# Patient Record
Sex: FEMALE | Race: WHITE | Employment: FULL TIME
[De-identification: names, ages, dates, MRNs, and addresses within clinical notes are randomized per-mention and may not be internally consistent; named-entity substitution may affect disease eponyms.]

---
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
## 2019-01-14 ENCOUNTER — TELEPHONE (OUTPATIENT)
Dept: FAMILY MEDICINE CLINIC | Age: 19
End: 2019-01-14

## 2019-03-04 ENCOUNTER — OFFICE VISIT (OUTPATIENT)
Dept: FAMILY MEDICINE CLINIC | Age: 19
End: 2019-03-04
Payer: COMMERCIAL

## 2019-03-04 VITALS
WEIGHT: 257 LBS | HEIGHT: 64 IN | BODY MASS INDEX: 43.87 KG/M2 | SYSTOLIC BLOOD PRESSURE: 115 MMHG | DIASTOLIC BLOOD PRESSURE: 82 MMHG

## 2019-03-04 DIAGNOSIS — A59.9 TRICHIMONIASIS: ICD-10-CM

## 2019-03-04 DIAGNOSIS — E46 MALNUTRITION, UNSPECIFIED TYPE (HCC): Primary | ICD-10-CM

## 2019-03-04 DIAGNOSIS — Z30.432 AWAITING REMOVAL OF CONTRACEPTIVE INTRAUTERINE DEVICE (IUD): ICD-10-CM

## 2019-03-04 DIAGNOSIS — N89.8 VAGINAL DISCHARGE: ICD-10-CM

## 2019-03-04 DIAGNOSIS — Z86.19 HISTORY OF SEXUALLY TRANSMITTED DISEASE: ICD-10-CM

## 2019-03-04 DIAGNOSIS — R11.2 INTRACTABLE VOMITING WITH NAUSEA, UNSPECIFIED VOMITING TYPE: ICD-10-CM

## 2019-03-04 DIAGNOSIS — R10.13 EPIGASTRIC PAIN: ICD-10-CM

## 2019-03-04 DIAGNOSIS — Z98.890 S/P GASTRIC SURGERY: ICD-10-CM

## 2019-03-04 DIAGNOSIS — B96.89 BV (BACTERIAL VAGINOSIS): ICD-10-CM

## 2019-03-04 DIAGNOSIS — N76.0 BV (BACTERIAL VAGINOSIS): ICD-10-CM

## 2019-03-04 DIAGNOSIS — Q21.12 PFO (PATENT FORAMEN OVALE): ICD-10-CM

## 2019-03-04 PROCEDURE — 1036F TOBACCO NON-USER: CPT | Performed by: NURSE PRACTITIONER

## 2019-03-04 PROCEDURE — G8417 CALC BMI ABV UP PARAM F/U: HCPCS | Performed by: NURSE PRACTITIONER

## 2019-03-04 PROCEDURE — G8484 FLU IMMUNIZE NO ADMIN: HCPCS | Performed by: NURSE PRACTITIONER

## 2019-03-04 PROCEDURE — 99213 OFFICE O/P EST LOW 20 MIN: CPT | Performed by: NURSE PRACTITIONER

## 2019-03-04 PROCEDURE — G8427 DOCREV CUR MEDS BY ELIG CLIN: HCPCS | Performed by: NURSE PRACTITIONER

## 2019-03-04 PROCEDURE — 96372 THER/PROPH/DIAG INJ SC/IM: CPT | Performed by: NURSE PRACTITIONER

## 2019-03-04 RX ORDER — URSODIOL 500 MG/1
500 TABLET, FILM COATED ORAL
COMMUNITY
Start: 2018-12-19 | End: 2019-10-17 | Stop reason: ALTCHOICE

## 2019-03-04 RX ORDER — METRONIDAZOLE 500 MG/1
500 TABLET ORAL 2 TIMES DAILY
Qty: 14 TABLET | Refills: 0 | Status: SHIPPED | OUTPATIENT
Start: 2019-03-04 | End: 2019-03-11

## 2019-03-04 RX ORDER — FAMOTIDINE 20 MG/1
20 TABLET, FILM COATED ORAL
COMMUNITY
Start: 2019-02-21 | End: 2019-10-17

## 2019-03-04 RX ORDER — SUCRALFATE ORAL 1 G/10ML
1000 SUSPENSION ORAL
COMMUNITY
Start: 2019-02-21 | End: 2019-10-17

## 2019-03-04 RX ORDER — CEFTRIAXONE 500 MG/1
500 INJECTION, POWDER, FOR SOLUTION INTRAMUSCULAR; INTRAVENOUS ONCE
Status: COMPLETED | OUTPATIENT
Start: 2019-03-04 | End: 2019-03-04

## 2019-03-04 RX ADMIN — CEFTRIAXONE 500 MG: 500 INJECTION, POWDER, FOR SOLUTION INTRAMUSCULAR; INTRAVENOUS at 10:25

## 2019-03-04 ASSESSMENT — ENCOUNTER SYMPTOMS
NAUSEA: 1
COUGH: 0
DIARRHEA: 0
ABDOMINAL PAIN: 1
BLOOD IN STOOL: 0
ABDOMINAL DISTENTION: 0
CONSTIPATION: 0
VOMITING: 1
ANAL BLEEDING: 0
SHORTNESS OF BREATH: 0
CHEST TIGHTNESS: 0

## 2019-03-04 ASSESSMENT — PATIENT HEALTH QUESTIONNAIRE - PHQ9
SUM OF ALL RESPONSES TO PHQ QUESTIONS 1-9: 3
SUM OF ALL RESPONSES TO PHQ9 QUESTIONS 1 & 2: 3
SUM OF ALL RESPONSES TO PHQ QUESTIONS 1-9: 3
1. LITTLE INTEREST OR PLEASURE IN DOING THINGS: 1
2. FEELING DOWN, DEPRESSED OR HOPELESS: 2

## 2019-03-05 ENCOUNTER — TELEPHONE (OUTPATIENT)
Dept: FAMILY MEDICINE CLINIC | Age: 19
End: 2019-03-05

## 2019-03-12 ENCOUNTER — HOSPITAL ENCOUNTER (OUTPATIENT)
Dept: NON INVASIVE DIAGNOSTICS | Age: 19
Discharge: HOME | End: 2019-03-12
Payer: COMMERCIAL

## 2019-03-12 DIAGNOSIS — Q21.12 PFO (PATENT FORAMEN OVALE): ICD-10-CM

## 2019-03-12 LAB
LV EF: 55 %
LVEF MODALITY: NORMAL

## 2019-03-12 PROCEDURE — 93306 TTE W/DOPPLER COMPLETE: CPT

## 2019-03-15 ENCOUNTER — HOSPITAL ENCOUNTER (OUTPATIENT)
Age: 19
Setting detail: SPECIMEN
Discharge: HOME | End: 2019-03-15
Payer: COMMERCIAL

## 2019-03-15 ENCOUNTER — TELEPHONE (OUTPATIENT)
Dept: FAMILY MEDICINE CLINIC | Age: 19
End: 2019-03-15

## 2019-03-15 ENCOUNTER — OFFICE VISIT (OUTPATIENT)
Dept: FAMILY MEDICINE CLINIC | Age: 19
End: 2019-03-15
Payer: COMMERCIAL

## 2019-03-15 VITALS
OXYGEN SATURATION: 97 % | DIASTOLIC BLOOD PRESSURE: 68 MMHG | SYSTOLIC BLOOD PRESSURE: 112 MMHG | HEIGHT: 64 IN | HEART RATE: 68 BPM | TEMPERATURE: 96.7 F | WEIGHT: 240 LBS | BODY MASS INDEX: 40.97 KG/M2

## 2019-03-15 DIAGNOSIS — E46 MALNUTRITION, UNSPECIFIED TYPE (HCC): ICD-10-CM

## 2019-03-15 DIAGNOSIS — M54.50 MIDLINE LOW BACK PAIN WITHOUT SCIATICA, UNSPECIFIED CHRONICITY: Primary | ICD-10-CM

## 2019-03-15 DIAGNOSIS — Z98.890 S/P GASTRIC SURGERY: ICD-10-CM

## 2019-03-15 DIAGNOSIS — M54.50 MIDLINE LOW BACK PAIN WITHOUT SCIATICA, UNSPECIFIED CHRONICITY: ICD-10-CM

## 2019-03-15 DIAGNOSIS — R11.2 INTRACTABLE VOMITING WITH NAUSEA, UNSPECIFIED VOMITING TYPE: ICD-10-CM

## 2019-03-15 DIAGNOSIS — N89.8 VAGINAL DISCHARGE: ICD-10-CM

## 2019-03-15 LAB
BILIRUBIN, POC: NORMAL
BLOOD URINE, POC: NEGATIVE
CLARITY, POC: CLEAR
COLOR, POC: YELLOW
DIRECT EXAM: ABNORMAL
GLUCOSE URINE, POC: NEGATIVE
KETONES, POC: 40
LEUKOCYTE EST, POC: NORMAL
Lab: ABNORMAL
NITRITE, POC: NEGATIVE
PH, POC: 7
PROTEIN, POC: NEGATIVE
SPECIFIC GRAVITY, POC: 1.02
SPECIMEN DESCRIPTION: ABNORMAL
UROBILINOGEN, POC: 0.2

## 2019-03-15 PROCEDURE — G8417 CALC BMI ABV UP PARAM F/U: HCPCS | Performed by: NURSE PRACTITIONER

## 2019-03-15 PROCEDURE — 99214 OFFICE O/P EST MOD 30 MIN: CPT | Performed by: NURSE PRACTITIONER

## 2019-03-15 PROCEDURE — 1036F TOBACCO NON-USER: CPT | Performed by: NURSE PRACTITIONER

## 2019-03-15 PROCEDURE — G8427 DOCREV CUR MEDS BY ELIG CLIN: HCPCS | Performed by: NURSE PRACTITIONER

## 2019-03-15 PROCEDURE — 81002 URINALYSIS NONAUTO W/O SCOPE: CPT | Performed by: NURSE PRACTITIONER

## 2019-03-15 PROCEDURE — G8484 FLU IMMUNIZE NO ADMIN: HCPCS | Performed by: NURSE PRACTITIONER

## 2019-03-15 RX ORDER — CIPROFLOXACIN 500 MG/1
500 TABLET, FILM COATED ORAL 2 TIMES DAILY
Qty: 10 TABLET | Refills: 0 | Status: SHIPPED | OUTPATIENT
Start: 2019-03-15 | End: 2019-03-18 | Stop reason: ALTCHOICE

## 2019-03-15 ASSESSMENT — ENCOUNTER SYMPTOMS
ABDOMINAL DISTENTION: 0
NAUSEA: 1
ABDOMINAL PAIN: 1
DIARRHEA: 0
CONSTIPATION: 0
COUGH: 0
SHORTNESS OF BREATH: 0
BACK PAIN: 1
VOMITING: 1
COLOR CHANGE: 0

## 2019-03-16 LAB
CULTURE: NORMAL
Lab: NORMAL
SPECIMEN DESCRIPTION: NORMAL

## 2019-03-17 RX ORDER — CLINDAMYCIN HYDROCHLORIDE 300 MG/1
300 CAPSULE ORAL 2 TIMES DAILY
Qty: 14 CAPSULE | Refills: 0 | Status: SHIPPED | OUTPATIENT
Start: 2019-03-17 | End: 2019-03-18 | Stop reason: CLARIF

## 2019-03-18 ENCOUNTER — TELEPHONE (OUTPATIENT)
Dept: FAMILY MEDICINE CLINIC | Age: 19
End: 2019-03-18

## 2019-03-18 LAB
C TRACH DNA GENITAL QL NAA+PROBE: NEGATIVE
N. GONORRHOEAE DNA: NEGATIVE
SPECIMEN DESCRIPTION: NORMAL

## 2019-03-18 RX ORDER — METRONIDAZOLE 7.5 MG/G
GEL VAGINAL
Qty: 70 G | Refills: 0 | Status: SHIPPED | OUTPATIENT
Start: 2019-03-18 | End: 2019-03-25

## 2019-03-19 ENCOUNTER — PROCEDURE VISIT (OUTPATIENT)
Dept: OBGYN CLINIC | Age: 19
End: 2019-03-19
Payer: COMMERCIAL

## 2019-03-19 ENCOUNTER — HOSPITAL ENCOUNTER (OUTPATIENT)
Age: 19
Setting detail: SPECIMEN
Discharge: HOME | End: 2019-03-19
Payer: COMMERCIAL

## 2019-03-19 VITALS
SYSTOLIC BLOOD PRESSURE: 124 MMHG | BODY MASS INDEX: 40.8 KG/M2 | HEIGHT: 64 IN | DIASTOLIC BLOOD PRESSURE: 78 MMHG | WEIGHT: 239 LBS

## 2019-03-19 DIAGNOSIS — Z11.3 ROUTINE SCREENING FOR STI (SEXUALLY TRANSMITTED INFECTION): Primary | ICD-10-CM

## 2019-03-19 DIAGNOSIS — N94.6 DYSMENORRHEA: ICD-10-CM

## 2019-03-19 DIAGNOSIS — Z11.3 ROUTINE SCREENING FOR STI (SEXUALLY TRANSMITTED INFECTION): ICD-10-CM

## 2019-03-19 LAB
DIRECT EXAM: ABNORMAL
Lab: ABNORMAL
SPECIMEN DESCRIPTION: ABNORMAL

## 2019-03-19 PROCEDURE — 58300 INSERT INTRAUTERINE DEVICE: CPT | Performed by: OBSTETRICS & GYNECOLOGY

## 2019-03-19 PROCEDURE — 58301 REMOVE INTRAUTERINE DEVICE: CPT | Performed by: OBSTETRICS & GYNECOLOGY

## 2019-04-16 ENCOUNTER — TELEPHONE (OUTPATIENT)
Dept: OBGYN CLINIC | Age: 19
End: 2019-04-16

## 2019-04-16 ENCOUNTER — HOSPITAL ENCOUNTER (OUTPATIENT)
Age: 19
Setting detail: SPECIMEN
Discharge: HOME | End: 2019-04-16
Payer: COMMERCIAL

## 2019-04-16 ENCOUNTER — OFFICE VISIT (OUTPATIENT)
Dept: OBGYN CLINIC | Age: 19
End: 2019-04-16

## 2019-04-16 VITALS
BODY MASS INDEX: 38.41 KG/M2 | WEIGHT: 225 LBS | SYSTOLIC BLOOD PRESSURE: 122 MMHG | DIASTOLIC BLOOD PRESSURE: 82 MMHG | HEIGHT: 64 IN

## 2019-04-16 DIAGNOSIS — Z30.431 IUD CHECK UP: Primary | ICD-10-CM

## 2019-04-16 DIAGNOSIS — N89.8 VAGINAL DISCHARGE: ICD-10-CM

## 2019-04-16 LAB
DIRECT EXAM: NORMAL
Lab: NORMAL
SPECIMEN DESCRIPTION: NORMAL

## 2019-04-16 PROCEDURE — 1036F TOBACCO NON-USER: CPT | Performed by: OBSTETRICS & GYNECOLOGY

## 2019-04-16 PROCEDURE — 99024 POSTOP FOLLOW-UP VISIT: CPT | Performed by: OBSTETRICS & GYNECOLOGY

## 2019-04-16 PROCEDURE — G8427 DOCREV CUR MEDS BY ELIG CLIN: HCPCS | Performed by: OBSTETRICS & GYNECOLOGY

## 2019-04-16 PROCEDURE — G8417 CALC BMI ABV UP PARAM F/U: HCPCS | Performed by: OBSTETRICS & GYNECOLOGY

## 2019-04-16 RX ORDER — TRANEXAMIC ACID 650 1/1
650 TABLET ORAL 3 TIMES DAILY
Qty: 15 TABLET | Refills: 0 | Status: SHIPPED | OUTPATIENT
Start: 2019-04-16 | End: 2019-10-17

## 2019-04-16 ASSESSMENT — ENCOUNTER SYMPTOMS
WHEEZING: 0
ABDOMINAL PAIN: 0
VOMITING: 0
DIARRHEA: 0
CONSTIPATION: 0
NAUSEA: 0
COUGH: 0

## 2019-04-16 NOTE — TELEPHONE ENCOUNTER
received a fax from [employer] that ramonita FITZPATRICK , called [facility] at [phone] and spoke with Amanda Asher. Gave clinical info and she got approval with # [id number] .  Then called [facility] and spoke with Jodi Dumont and rx went thru

## 2019-04-16 NOTE — PROGRESS NOTES
[facility], [address] [place] [address],[address]  [place], [address] [place] [address] Avenue OF VISIT:  19    [address] [place] [address]    :  2000  CHIEF COMPLAINT:    Chief Complaint   Patient presents with    1 Month Follow-Up     Clarita Pel IUD Insert 3/19/19 - Has been bleeding since a few days after insertion, bleeding has been heavy/clotty with constant cramping. HPI :   [address] [place] [address] is a 22 y.o. female here for follow up after Clarita Pel insertion 3/19/19. She reports daily bleeding since placement, with occasional cramping which will last about an hour. She's had several episodes of bacterial vaginosis in the past and is unsure if she's having symptoms now. She is unable to take Motrin since her gastric sleeve procedure. Hasn't tried Tylenol. Some discomfort with intercourse.      Past Medical History:   Diagnosis Date    Anxiety     Bipolar 1 disorder ([address]     BV (bacterial vaginosis) 2018    Chlamydia     Depression     Diarrhea     FREQUENT    Gonorrhea     Migraine     Neurocardiogenic syncope     DR MARTIN    Obesity 2012    Panic attacks     PFO (patent foramen ovale) 2012    PFO (patent foramen ovale) 2012    PTSD (post-traumatic stress disorder)     Sensory integration disorder     Trichimoniasis 2018      Past Surgical History:   Procedure Laterality Date    APPENDECTOMY      COLONOSCOPY  2015    ENDOSCOPY, COLON, DIAGNOSTIC      INTRAUTERINE DEVICE INSERTION  03/15/16    Radha placement    SLEEVE GASTRECTOMY  2018    TONSILLECTOMY AND ADENOIDECTOMY      WISDOM TOOTH EXTRACTION Bilateral 11/113/15    x4     Family History   Problem Relation Age of Onset    Diabetes Mother     Other Mother         memory problems    Cancer Maternal Aunt         bone    Brain Cancer Maternal Aunt     Cancer Maternal Grandmother         lung    No Known Problems Father     No Known Problems Maternal Grandfather     No Known Problems Paternal Grandmother     No Known Problems Paternal Grandfather     Other Other         Gallstones, Pancreatitis     Social History     Tobacco Use   Smoking Status Passive Smoke Exposure - Never Smoker   Smokeless Tobacco Never Used     Social History     Substance and Sexual Activity   Alcohol Use No    Alcohol/week: 0.0 oz     Current Outpatient Medications   Medication Sig Dispense Refill    tranexamic acid (LYSTEDA) 650 MG TABS tablet Take 1 tablet by mouth 3 times daily for 5 days 15 tablet 0    CARBONYL IRON PO Take 1 tablet by mouth      famotidine (PEPCID) 20 MG tablet Take 20 mg by mouth      sucralfate (CARAFATE) 1 GM/10ML suspension Take 1,000 mg by mouth      ursodiol (ACTIGALL) 500 MG tablet Take 500 mg by mouth      albuterol sulfate HFA (VENTOLIN HFA) 108 (90 Base) MCG/ACT inhaler Inhale 2 puffs into the lungs every 4 hours as needed for Wheezing or Shortness of Breath 1 Inhaler 0    risperiDONE (RISPERDAL) 1 MG tablet take 1 tablet by mouth daily at bedtime  0    lamoTRIgine (LAMICTAL) 25 MG tablet Take 25 mg by mouth daily       Current Facility-Administered Medications   Medication Dose Route Frequency Provider Last Rate Last Dose    Levonorgestrel (KYLEENA) IUD 19.5 mg 1 each  19.5 mg Intrauterine Continuous Carlos Bolden MD   1 each at 19 1412       Allergies:  Patient has no known allergies. Gynecologic History:  No LMP recorded. Sexually Active: Yes  STD History: Yes GC/CT      OB History    Para Term  AB Living   1 0 0 0 1 0   SAB TAB Ectopic Molar Multiple Live Births   0 0 0 0 0 0       Review of Systems   Constitutional: Negative for chills and fever. HENT: Negative for hearing loss. Respiratory: Negative for cough and wheezing. Cardiovascular: Negative for chest pain and palpitations. Gastrointestinal: Negative for abdominal pain, constipation, diarrhea, nausea and vomiting. Genitourinary: Negative for dysuria, frequency and urgency. Musculoskeletal: Negative for myalgias. Skin: Negative for rash. Neurological: Negative for dizziness, weakness and headaches. Hematological: Does not bruise/bleed easily. Psychiatric/Behavioral: Negative for suicidal ideas. /82 (Position: Sitting, Cuff Size: Large Adult)   Ht 5' 4\" (1.626 m)   Wt 225 lb (102.1 kg)   BMI 38.62 kg/m²     Physical Exam   Constitutional: She is oriented to person, place, and time. She appears well-developed and well-nourished. HENT:   Head: Normocephalic. Eyes: EOM are normal.   Neck: Normal range of motion. No thyromegaly present. Cardiovascular: Normal rate and regular rhythm. Pulmonary/Chest: Effort normal. No respiratory distress. Abdominal: Soft. She exhibits no distension. There is no tenderness. Genitourinary: There is no rash, tenderness, lesion or injury on the right labia. There is no rash, tenderness, lesion or injury on the left labia. Uterus is not deviated, not enlarged, not fixed and not tender. Cervix exhibits no motion tenderness, no discharge and no friability. Right adnexum displays no mass, no tenderness and no fullness. Left adnexum displays no mass, no tenderness and no fullness. No erythema or tenderness in the vagina. No foreign body in the vagina. No signs of injury around the vagina. Vaginal discharge found. Genitourinary Comments: Kyleena strings visualized per os. Mild anterior vaginal wall tenderness on exam, no levator ani tenderness or uterosacral ligament tenderness. Musculoskeletal: Normal range of motion. Neurological: She is alert and oriented to person, place, and time. Skin: Skin is warm and dry. Psychiatric: She has a normal mood and affect. Her behavior is normal. Judgment and thought content normal.       ASSESSMENT:  Kevin Veras is a 22 y.o. female      ICD-10-CM    1. IUD check up Z30.431    2. Vaginal discharge N89.8        PLAN:    Reviewed that bleeding will vary for the first 3-6 months of a new IUD.  Encouraged patient to give some more time for symptoms to resolve. Will recheck Affirm as she's had several episodes of BV in the past year. Declines GC/CT. If Affirm negative, will check USN to assess for other causes of pain.     Electronically signed by Roosevelt Hernandez MD on 4/16/2019 at 2:03 PM

## 2019-05-14 ENCOUNTER — PROCEDURE VISIT (OUTPATIENT)
Dept: OBGYN CLINIC | Age: 19
End: 2019-05-14
Payer: COMMERCIAL

## 2019-05-14 VITALS
HEIGHT: 64 IN | SYSTOLIC BLOOD PRESSURE: 136 MMHG | DIASTOLIC BLOOD PRESSURE: 82 MMHG | WEIGHT: 217 LBS | BODY MASS INDEX: 37.05 KG/M2

## 2019-05-14 DIAGNOSIS — Z30.016 ENCOUNTER FOR INITIAL PRESCRIPTION OF TRANSDERMAL PATCH HORMONAL CONTRACEPTIVE DEVICE: ICD-10-CM

## 2019-05-14 DIAGNOSIS — N93.9 ABNORMAL UTERINE BLEEDING (AUB): ICD-10-CM

## 2019-05-14 DIAGNOSIS — T83.9XXD COMPLICATION OF INTRAUTERINE DEVICE (IUD), UNSPECIFIED COMPLICATION, SUBSEQUENT ENCOUNTER: Primary | ICD-10-CM

## 2019-05-14 PROCEDURE — 58301 REMOVE INTRAUTERINE DEVICE: CPT | Performed by: OBSTETRICS & GYNECOLOGY

## 2019-05-14 ASSESSMENT — ENCOUNTER SYMPTOMS
ABDOMINAL PAIN: 0
CONSTIPATION: 0
WHEEZING: 0
NAUSEA: 0
VOMITING: 0
COUGH: 0
DIARRHEA: 0

## 2019-05-14 NOTE — PROGRESS NOTES
[facility], [address] [place] [address], [address] [place] [address] Avenue OF VISIT:  19    [address] [place] [address]    :  2000  CHIEF COMPLAINT:    Chief Complaint   Patient presents with    Procedure     IUD removal - Edythe Martinet was inserted on 3/19/19 - pain worsening, bleeding hasn't stopped        HPI :   [address] [place] [address] is a 22 y.o. female here for removal of her Edythe Martinet IUD. It was inserted without complication on 32 and she's had continuous bleeding and lower abdominal cramping since it was placed. On 19 she followed up in the office and it was confirmed to be in the correct location. She is unable to take Motrin due to history of gastric bypass, but Tylenol has not helped. She's tried Tranexamic acid as well. She had an ED visit 18 for abdominal pain and was diagnosed with internal hemorrhoids at that time, she is following up with general surgery in  for these. She wants to [place] her body a break\" and not initiate contraception immediately.     Past Medical History:   Diagnosis Date    Anxiety     Bipolar 1 disorder ([address]     BV (bacterial vaginosis) 2018    Chlamydia     Depression     Diarrhea     FREQUENT    Gonorrhea     Migraine     Neurocardiogenic syncope     DR MARTIN    Obesity 2012    Panic attacks     PFO (patent foramen ovale) 2012    PFO (patent foramen ovale) 2012    PTSD (post-traumatic stress disorder)     Sensory integration disorder     Trichimoniasis 2018      Past Surgical History:   Procedure Laterality Date    APPENDECTOMY      COLONOSCOPY  2015    ENDOSCOPY, COLON, DIAGNOSTIC      INTRAUTERINE DEVICE INSERTION  03/15/16    Radha placement    SLEEVE GASTRECTOMY  2018    TONSILLECTOMY AND ADENOIDECTOMY      WISDOM TOOTH EXTRACTION Bilateral 11/113/15    x4     Family History   Problem Relation Age of Onset    Diabetes Mother     Other Mother         memory problems    Cancer Maternal Aunt         bone    Brain Cancer Maternal Aunt     Cancer Maternal Grandmother         lung    No Known Problems Father     No Known Problems Maternal Grandfather     No Known Problems Paternal Grandmother     No Known Problems Paternal Grandfather     Other Other         Gallstones, Pancreatitis     Social History     Tobacco Use   Smoking Status Passive Smoke Exposure - Never Smoker   Smokeless Tobacco Never Used     Social History     Substance and Sexual Activity   Alcohol Use No    Alcohol/week: 0.0 oz     Current Outpatient Medications   Medication Sig Dispense Refill    norelgestromin-ethinyl estradiol (ORTHO EVRA) 150-35 MCG/24HR Place 1 patch onto the skin every 7 days 3 patch 3    tranexamic acid (LYSTEDA) 650 MG TABS tablet Take 1 tablet by mouth 3 times daily for 5 days 15 tablet 0    CARBONYL IRON PO Take 1 tablet by mouth      famotidine (PEPCID) 20 MG tablet Take 20 mg by mouth      sucralfate (CARAFATE) 1 GM/10ML suspension Take 1,000 mg by mouth      ursodiol (ACTIGALL) 500 MG tablet Take 500 mg by mouth      albuterol sulfate HFA (VENTOLIN HFA) 108 (90 Base) MCG/ACT inhaler Inhale 2 puffs into the lungs every 4 hours as needed for Wheezing or Shortness of Breath 1 Inhaler 0    risperiDONE (RISPERDAL) 1 MG tablet take 1 tablet by mouth daily at bedtime  0    lamoTRIgine (LAMICTAL) 25 MG tablet Take 25 mg by mouth daily       Current Facility-Administered Medications   Medication Dose Route Frequency Provider Last Rate Last Dose    Levonorgestrel (KYLEENA) IUD 19.5 mg 1 each  19.5 mg Intrauterine Continuous Renny Mcbride MD   1 each at 19 1412       Allergies:  Patient has no known allergies. Gynecologic History:  No LMP recorded. Sexually Active: Yes  STD History: Yes h/o PID with chlamydia      OB History    Para Term  AB Living   1 0 0 0 1 0   SAB TAB Ectopic Molar Multiple Live Births   0 0 0 0 0 0       Review of Systems   Constitutional: Negative for chills and fever.    HENT: Negative for hearing loss. Respiratory: Negative for cough and wheezing. Cardiovascular: Negative for chest pain and palpitations. Gastrointestinal: Negative for abdominal pain, constipation, diarrhea, nausea and vomiting. Genitourinary: Positive for pelvic pain and vaginal bleeding. Negative for dysuria, frequency and urgency. Musculoskeletal: Negative for myalgias. Skin: Negative for rash. Neurological: Negative for dizziness, weakness and headaches. Hematological: Does not bruise/bleed easily. Psychiatric/Behavioral: Negative for suicidal ideas. /82 (Position: Sitting, Cuff Size: Medium Adult)   Ht 5' 4\" (1.626 m)   Wt 217 lb (98.4 kg)   BMI 37.25 kg/m²     Physical Exam   Constitutional: She is oriented to person, place, and time. She appears well-developed and well-nourished. HENT:   Head: Normocephalic. Eyes: EOM are normal.   Neck: Normal range of motion. No thyromegaly present. Cardiovascular: Normal rate and regular rhythm. Pulmonary/Chest: Effort normal. No respiratory distress. Abdominal: Soft. She exhibits no distension. There is no tenderness. Genitourinary: There is no rash, tenderness, lesion or injury on the right labia. There is no rash, tenderness, lesion or injury on the left labia. Cervix exhibits no motion tenderness, no discharge and no friability. There is bleeding in the vagina. No erythema or tenderness in the vagina. No foreign body in the vagina. No signs of injury around the vagina. No vaginal discharge found. Genitourinary Comments: IUD strings visualized per os. Grasped with ring forceps and removed intact. Pt tolerated well. She was shown the IUD for confirmation of removal.    Musculoskeletal: Normal range of motion. Neurological: She is alert and oriented to person, place, and time. Skin: Skin is warm and dry. Psychiatric: She has a normal mood and affect.  Her behavior is normal. Judgment and thought content normal. ASSESSMENT:  Addi Garrido is a 22 y.o. female      ICD-10-CM    1. Complication of intrauterine device (IUD), unspecified complication, subsequent encounter T83. 9XXD 51999 - [place] REMOVE INTRAUTERINE DEVICE   2. Abnormal uterine bleeding (AUB) N93.9 89961 - [place] REMOVE INTRAUTERINE DEVICE   3. Encounter for initial prescription of transdermal patch hormonal contraceptive device Z30.016        PLAN:    IUD removed per patient request. Advised patient consider starting contraception immediately so she is protected against pregnancy. She is amenable to a prescription for the patch, declined another IUD or Nexplanon. Recommended condoms for STD prevention.     Electronically signed by Henry Esparza MD on 5/14/2019 at 3:34 PM

## 2019-05-16 ENCOUNTER — OFFICE VISIT (OUTPATIENT)
Dept: FAMILY MEDICINE CLINIC | Age: 19
End: 2019-05-16
Payer: COMMERCIAL

## 2019-05-16 VITALS
SYSTOLIC BLOOD PRESSURE: 116 MMHG | OXYGEN SATURATION: 97 % | TEMPERATURE: 97 F | DIASTOLIC BLOOD PRESSURE: 72 MMHG | WEIGHT: 218 LBS | HEART RATE: 68 BPM | BODY MASS INDEX: 37.22 KG/M2 | HEIGHT: 64 IN

## 2019-05-16 DIAGNOSIS — R09.81 SINUS CONGESTION: ICD-10-CM

## 2019-05-16 DIAGNOSIS — H69.83 EUSTACHIAN TUBE DYSFUNCTION, BILATERAL: Primary | ICD-10-CM

## 2019-05-16 DIAGNOSIS — F41.9 ANXIETY: ICD-10-CM

## 2019-05-16 DIAGNOSIS — F31.9 BIPOLAR 1 DISORDER (HCC): ICD-10-CM

## 2019-05-16 PROCEDURE — G8427 DOCREV CUR MEDS BY ELIG CLIN: HCPCS | Performed by: NURSE PRACTITIONER

## 2019-05-16 PROCEDURE — G8417 CALC BMI ABV UP PARAM F/U: HCPCS | Performed by: NURSE PRACTITIONER

## 2019-05-16 PROCEDURE — 96372 THER/PROPH/DIAG INJ SC/IM: CPT | Performed by: NURSE PRACTITIONER

## 2019-05-16 PROCEDURE — 1036F TOBACCO NON-USER: CPT | Performed by: NURSE PRACTITIONER

## 2019-05-16 PROCEDURE — 99214 OFFICE O/P EST MOD 30 MIN: CPT | Performed by: NURSE PRACTITIONER

## 2019-05-16 RX ORDER — METHYLPREDNISOLONE 4 MG/1
TABLET ORAL
Qty: 1 KIT | Refills: 0 | Status: SHIPPED | OUTPATIENT
Start: 2019-05-16 | End: 2019-05-22

## 2019-05-16 RX ORDER — LORATADINE AND PSEUDOEPHEDRINE 10; 240 MG/1; MG/1
1 TABLET, EXTENDED RELEASE ORAL DAILY
Qty: 30 TABLET | Refills: 3 | Status: SHIPPED | OUTPATIENT
Start: 2019-05-16 | End: 2019-10-17

## 2019-05-16 RX ORDER — TRIAMCINOLONE ACETONIDE 40 MG/ML
40 INJECTION, SUSPENSION INTRA-ARTICULAR; INTRAMUSCULAR ONCE
Status: COMPLETED | OUTPATIENT
Start: 2019-05-16 | End: 2019-05-16

## 2019-05-16 RX ORDER — FLUTICASONE PROPIONATE 50 MCG
2 SPRAY, SUSPENSION (ML) NASAL DAILY
Qty: 1 BOTTLE | Refills: 3 | Status: ON HOLD | OUTPATIENT
Start: 2019-05-16 | End: 2022-10-21 | Stop reason: HOSPADM

## 2019-05-16 RX ORDER — DIAZEPAM 5 MG/1
5 TABLET ORAL EVERY 12 HOURS PRN
Qty: 15 TABLET | Refills: 0 | Status: SHIPPED | OUTPATIENT
Start: 2019-05-16 | End: 2019-05-26

## 2019-05-16 RX ADMIN — TRIAMCINOLONE ACETONIDE 40 MG: 40 INJECTION, SUSPENSION INTRA-ARTICULAR; INTRAMUSCULAR at 14:05

## 2019-05-16 ASSESSMENT — ENCOUNTER SYMPTOMS
CHEST TIGHTNESS: 0
COUGH: 0
SHORTNESS OF BREATH: 0
SORE THROAT: 0
SINUS PRESSURE: 1
RHINORRHEA: 1
DIARRHEA: 0
ABDOMINAL PAIN: 0
TROUBLE SWALLOWING: 0

## 2019-05-16 NOTE — PROGRESS NOTES
Visit Information    Have you changed or started any medications since your last visit including any over-the-counter medicines, vitamins, or herbal medicines? no   Have you stopped taking any of your medications? Is so, why? -  no  Are you having any side effects from any of your medications? - no    Have you seen any other physician or provider since your last visit?  no   Have you had any other diagnostic tests since your last visit?  no   Have you been seen in the emergency room and/or had an admission in a hospital since we last saw you?  no   Have you had your routine dental cleaning in the past 6 months?  no     Do you have an active MyChart account? If no, what is the barrier? No: na    Patient Care Team:  ERICK Alonso CNP as PCP - ERICK Krishnamurthy CNP as PCP - S Attributed Provider    Medical History Review  Past Medical, Family, and Social History reviewed and  contribute to the patient presenting condition    Health Maintenance   Topic Date Due    Flu vaccine (Season Ended) 09/01/2019    Chlamydia screen  03/19/2020    DTaP/Tdap/Td vaccine (3 - Td) 11/12/2020    HIV screen  Completed    HPV vaccine  Addressed    Varicella Vaccine  Addressed    Meningococcal (ACWY) Vaccine  Addressed    Pneumococcal 0-64 years Vaccine  Aged Out     After obtaining consent, and per orders of Alanna Whitney CNP, injection of Kenalog given in Left upper quad. gluteus by Jewelene Bud. Patient instructed to remain in clinic for 20 minutes afterwards, and to report any adverse reaction to me immediately.

## 2019-05-16 NOTE — PROGRESS NOTES
[address]  [place], [address]  [phone]      Shania Fulton is a 22 y.o. female who presents today for her  medicalconditions/complaints as noted below. Shania Fulton is c/o of Ear Fullness (bilateral,left worse than right)  . HPI:    Ear Fullness    There is pain in both ears. This is a recurrent problem. The current episode started more than 1 month ago. The problem occurs constantly. The problem has been waxing and waning. There has been no fever. The pain is mild. Associated symptoms include rhinorrhea. Pertinent negatives include no abdominal pain, coughing, diarrhea, ear discharge, headaches, hearing loss, neck pain or sore throat. Associated symptoms comments: Sinus congestion. Treatments tried: has been using generic decongestant spray for past few months. The treatment provided no relief. There is no history of a chronic ear infection. Bipolar: uncontrolled, she is off all psych meds and trying to get into new dr. Zapien Los very depressed and then manic at times. She is very cyclical with her patterns.  Would like valium if able for panic attacks for now    Past Medical History:   Diagnosis Date    Anxiety     Bipolar 1 disorder ([address]     BV (bacterial vaginosis) 11/28/2018    Chlamydia     Depression     Diarrhea     FREQUENT    Gonorrhea     Migraine     Neurocardiogenic syncope     DR MARTIN    Obesity 1/30/2012    Panic attacks     PFO (patent foramen ovale) 1/30/2012    PFO (patent foramen ovale) 1/30/2012    PTSD (post-traumatic stress disorder)     Sensory integration disorder     Trichimoniasis 11/28/2018      Past Surgical History:   Procedure Laterality Date    APPENDECTOMY      COLONOSCOPY  8/13/2015    ENDOSCOPY, COLON, DIAGNOSTIC      INTRAUTERINE DEVICE INSERTION  03/15/16    Radha placement    SLEEVE GASTRECTOMY  2018    TONSILLECTOMY AND ADENOIDECTOMY      WISDOM TOOTH EXTRACTION Bilateral 11/113/15    x4 Family History   Problem Relation Age of Onset    Diabetes Mother     Other Mother         memory problems    Cancer Maternal Aunt         bone    Brain Cancer Maternal Aunt     Cancer Maternal Grandmother         lung    No Known Problems Father     No Known Problems Maternal Grandfather     No Known Problems Paternal Grandmother     No Known Problems Paternal Grandfather     Other Other         Gallstones, Pancreatitis     Social History     Tobacco Use    Smoking status: Passive Smoke Exposure - Never Smoker    Smokeless tobacco: Never Used   Substance Use Topics    Alcohol use: No     Alcohol/week: 0.0 oz      Current Outpatient Medications   Medication Sig Dispense Refill    methylPREDNISolone (MEDROL DOSEPACK) 4 MG tablet Take by mouth. 1 kit 0    fluticasone (FLONASE) 50 MCG/ACT nasal spray 2 sprays by Nasal route daily 1 Bottle 3    loratadine-pseudoephedrine (CLARITIN-D 24 HOUR)  MG per extended release tablet Take 1 tablet by mouth daily 30 tablet 3    diazepam (VALIUM) 5 MG tablet Take 1 tablet by mouth every 12 hours as needed for Anxiety or Sleep for up to 10 days.  15 tablet 0    cariprazine hcl (VRAYLAR) 1.5 MG capsule Take 1 capsule by mouth daily 30 capsule 3    norelgestromin-ethinyl estradiol (ORTHO EVRA) 150-35 MCG/24HR Place 1 patch onto the skin every 7 days 3 patch 3    CARBONYL IRON PO Take 1 tablet by mouth      sucralfate (CARAFATE) 1 GM/10ML suspension Take 1,000 mg by mouth      ursodiol (ACTIGALL) 500 MG tablet Take 500 mg by mouth      albuterol sulfate HFA (VENTOLIN HFA) 108 (90 Base) MCG/ACT inhaler Inhale 2 puffs into the lungs every 4 hours as needed for Wheezing or Shortness of Breath 1 Inhaler 0    risperiDONE (RISPERDAL) 1 MG tablet take 1 tablet by mouth daily at bedtime  0    lamoTRIgine (LAMICTAL) 25 MG tablet Take 25 mg by mouth daily      tranexamic acid (LYSTEDA) 650 MG TABS tablet Take 1 tablet by mouth 3 times daily for 5 days 15 tablet 0    famotidine (PEPCID) 20 MG tablet Take 20 mg by mouth       Current Facility-Administered Medications   Medication Dose Route Frequency Provider Last Rate Last Dose    Levonorgestrel [facility]) IUD 19.5 mg 1 each  19.5 mg Intrauterine Continuous Merline Age, MD   1 each at 03/19/19 1412     No Known Allergies    Health Maintenance   Topic Date Due    Flu vaccine (Season Ended) 09/01/2019    Chlamydia screen  03/19/2020    DTaP/Tdap/Td vaccine (3 - Td) 11/12/2020    HIV screen  Completed    HPV vaccine  Addressed    Varicella Vaccine  Addressed    Meningococcal (ACWY) Vaccine  Addressed    Pneumococcal 0-64 years Vaccine  Aged Out       Subjective:      Review of Systems   Constitutional: Negative for activity change, appetite change, chills, diaphoresis, fatigue, fever and unexpected weight change. HENT: Positive for congestion, ear pain, postnasal drip, rhinorrhea and sinus pressure. Negative for ear discharge, hearing loss, sneezing, sore throat and trouble swallowing. Respiratory: Negative for cough, chest tightness and shortness of breath. Cardiovascular: Negative for chest pain and palpitations. Gastrointestinal: Negative for abdominal pain and diarrhea. Musculoskeletal: Negative for neck pain. Neurological: Negative for dizziness and headaches. Psychiatric/Behavioral: Positive for dysphoric mood. Negative for sleep disturbance. The patient is nervous/anxious. Bipolar         Objective:      Physical Exam   Constitutional: She is oriented to person, place, and time. She appears well-developed and well-nourished. No distress. /72 (Site: Left Upper Arm, Position: Sitting, Cuff Size: Medium Adult)   Pulse 68   Temp 97 °F (36.1 °C) (Tympanic)   Ht 5' 4\" (1.626 m)   Wt 218 lb (98.9 kg)   SpO2 97%   BMI 37.42 kg/m²      HENT:   Head: Normocephalic and atraumatic. Right Ear: Hearing, external ear and ear canal normal. Tympanic membrane is bulging.  Tympanic membrane is not erythematous. No middle ear effusion. Left Ear: Hearing, external ear and ear canal normal. Tympanic membrane is bulging. Tympanic membrane is not erythematous. No middle ear effusion. Nose: Mucosal edema present. No sinus tenderness. Right sinus exhibits no maxillary sinus tenderness and no frontal sinus tenderness. Left sinus exhibits no maxillary sinus tenderness and no frontal sinus tenderness. Mouth/Throat: Uvula is midline, oropharynx is clear and moist and mucous membranes are normal. No oropharyngeal exudate. Neck: Normal range of motion. Neck supple. Cardiovascular: Normal rate, regular rhythm and normal heart sounds. Pulmonary/Chest: Effort normal and breath sounds normal. No respiratory distress. She has no wheezes. Lymphadenopathy:     She has no cervical adenopathy. Neurological: She is alert and oriented to person, place, and time. Skin: Skin is warm and dry. Capillary refill takes less than 2 seconds. No rash noted. She is not diaphoretic. Psychiatric: She has a normal mood and affect. Her behavior is normal. Judgment and thought content normal.   Nursing note and vitals reviewed. Assessment:       Diagnosis Orders   1. Eustachian tube dysfunction, bilateral  triamcinolone acetonide (KENALOG-40) injection 40 mg    methylPREDNISolone (MEDROL DOSEPACK) 4 MG tablet    fluticasone (FLONASE) 50 MCG/ACT nasal spray    loratadine-pseudoephedrine (CLARITIN-D 24 HOUR)  MG per extended release tablet   2. Sinus congestion  triamcinolone acetonide (KENALOG-40) injection 40 mg    methylPREDNISolone (MEDROL DOSEPACK) 4 MG tablet    fluticasone (FLONASE) 50 MCG/ACT nasal spray    loratadine-pseudoephedrine (CLARITIN-D 24 HOUR)  MG per extended release tablet   3. Anxiety  diazepam (VALIUM) 5 MG tablet   4. Bipolar 1 disorder (HCC)  cariprazine hcl (VRAYLAR) 1.5 MG capsule       Plan:      No follow-ups on file.   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 Orders Placed This Encounter   Medications    triamcinolone acetonide (KENALOG-40) injection 40 mg    methylPREDNISolone (MEDROL DOSEPACK) 4 MG tablet     Sig: Take by mouth. Dispense:  1 kit     Refill:  0    fluticasone (FLONASE) 50 MCG/ACT nasal spray     Si sprays by Nasal route daily     Dispense:  1 Bottle     Refill:  3    loratadine-pseudoephedrine (CLARITIN-D 24 HOUR)  MG per extended release tablet     Sig: Take 1 tablet by mouth daily     Dispense:  30 tablet     Refill:  3    diazepam (VALIUM) 5 MG tablet     Sig: Take 1 tablet by mouth every 12 hours as needed for Anxiety or Sleep for up to 10 days. Dispense:  15 tablet     Refill:  0    cariprazine hcl (VRAYLAR) 1.5 MG capsule     Sig: Take 1 capsule by mouth daily     Dispense:  30 capsule     Refill:  3       Patient given educational materials - see patient instructions. Discussed use,benefit, and side effects of prescribed medications. All patient questions answered. Pt voiced understanding. Reviewed health maintenance. Instructed to continue currentmedications, diet and exercise.     Electronically signed by Jacki Ashraf CNP on 2019 at 2:16 PM

## 2019-06-11 ENCOUNTER — TELEPHONE (OUTPATIENT)
Dept: OBGYN CLINIC | Age: 19
End: 2019-06-11

## 2019-06-11 ENCOUNTER — APPOINTMENT (OUTPATIENT)
Dept: GENERAL RADIOLOGY | Age: 19
End: 2019-06-11
Payer: COMMERCIAL

## 2019-06-11 ENCOUNTER — HOSPITAL ENCOUNTER (EMERGENCY)
Age: 19
Discharge: HOME | End: 2019-06-11
Attending: EMERGENCY MEDICINE
Payer: COMMERCIAL

## 2019-06-11 VITALS
DIASTOLIC BLOOD PRESSURE: 93 MMHG | OXYGEN SATURATION: 96 % | HEIGHT: 64 IN | RESPIRATION RATE: 15 BRPM | SYSTOLIC BLOOD PRESSURE: 144 MMHG | TEMPERATURE: 97.9 F | HEART RATE: 89 BPM | BODY MASS INDEX: 35 KG/M2 | WEIGHT: 205 LBS

## 2019-06-11 DIAGNOSIS — N93.9 VAGINAL BLEEDING: Primary | ICD-10-CM

## 2019-06-11 LAB
-: NORMAL
ABSOLUTE EOS #: 0.04 K/UL (ref 0–0.44)
ABSOLUTE IMMATURE GRANULOCYTE: <0.03 K/UL (ref 0–0.3)
ABSOLUTE LYMPH #: 2.05 K/UL (ref 1.2–5.2)
ABSOLUTE MONO #: 0.49 K/UL (ref 0.1–1.4)
AMORPHOUS: NORMAL
ANION GAP SERPL CALCULATED.3IONS-SCNC: 12 MMOL/L (ref 9–17)
BACTERIA: NORMAL
BASOPHILS # BLD: 0 % (ref 0–2)
BASOPHILS ABSOLUTE: 0.03 K/UL (ref 0–0.2)
BILIRUBIN URINE: NEGATIVE
BUN BLDV-MCNC: 11 MG/DL (ref 6–20)
BUN/CREAT BLD: ABNORMAL (ref 9–20)
CALCIUM SERPL-MCNC: 9.3 MG/DL (ref 8.6–10.4)
CASTS UA: NORMAL /LPF (ref 0–8)
CHLORIDE BLD-SCNC: 108 MMOL/L (ref 98–107)
CO2: 23 MMOL/L (ref 20–31)
COLOR: ABNORMAL
COMMENT UA: ABNORMAL
CREAT SERPL-MCNC: 0.6 MG/DL (ref 0.5–0.9)
CRYSTALS, UA: NORMAL /HPF
DIFFERENTIAL TYPE: NORMAL
DIRECT EXAM: NORMAL
EOSINOPHILS RELATIVE PERCENT: 1 % (ref 1–4)
EPITHELIAL CELLS UA: NORMAL /HPF (ref 0–5)
GFR AFRICAN AMERICAN: ABNORMAL ML/MIN
GFR NON-AFRICAN AMERICAN: ABNORMAL ML/MIN
GFR SERPL CREATININE-BSD FRML MDRD: ABNORMAL ML/MIN/{1.73_M2}
GFR SERPL CREATININE-BSD FRML MDRD: ABNORMAL ML/MIN/{1.73_M2}
GLUCOSE BLD-MCNC: 95 MG/DL (ref 70–99)
GLUCOSE URINE: NEGATIVE
HCG QUALITATIVE: NEGATIVE
HCT VFR BLD CALC: 43.4 % (ref 36.3–47.1)
HEMOGLOBIN: 14.1 G/DL (ref 11.9–15.1)
IMMATURE GRANULOCYTES: 0 %
KETONES, URINE: ABNORMAL
LEUKOCYTE ESTERASE, URINE: ABNORMAL
LYMPHOCYTES # BLD: 28 % (ref 25–45)
Lab: NORMAL
MCH RBC QN AUTO: 28 PG (ref 25.2–33.5)
MCHC RBC AUTO-ENTMCNC: 32.5 G/DL (ref 28.4–34.8)
MCV RBC AUTO: 86.1 FL (ref 82.6–102.9)
MONOCYTES # BLD: 7 % (ref 2–8)
MUCUS: NORMAL
NITRITE, URINE: NEGATIVE
NRBC AUTOMATED: 0 PER 100 WBC
OTHER OBSERVATIONS UA: NORMAL
PDW BLD-RTO: 13.6 % (ref 11.8–14.4)
PH UA: 7 (ref 5–8)
PLATELET # BLD: 222 K/UL (ref 138–453)
PLATELET ESTIMATE: NORMAL
PMV BLD AUTO: 11.4 FL (ref 8.1–13.5)
POTASSIUM SERPL-SCNC: 4.3 MMOL/L (ref 3.7–5.3)
PROTEIN UA: ABNORMAL
RBC # BLD: 5.04 M/UL (ref 3.95–5.11)
RBC # BLD: NORMAL 10*6/UL
RBC UA: NORMAL /HPF (ref 0–4)
RENAL EPITHELIAL, UA: NORMAL /HPF
SEG NEUTROPHILS: 64 % (ref 34–64)
SEGMENTED NEUTROPHILS ABSOLUTE COUNT: 4.6 K/UL (ref 1.8–8)
SODIUM BLD-SCNC: 143 MMOL/L (ref 135–144)
SPECIFIC GRAVITY UA: 1.02 (ref 1–1.03)
SPECIMEN DESCRIPTION: NORMAL
TRICHOMONAS: NORMAL
TURBIDITY: ABNORMAL
URINE HGB: ABNORMAL
UROBILINOGEN, URINE: NORMAL
WBC # BLD: 7.2 K/UL (ref 4.5–13.5)
WBC # BLD: NORMAL 10*3/UL
WBC UA: NORMAL /HPF (ref 0–5)
YEAST: NORMAL

## 2019-06-11 PROCEDURE — 99285 EMERGENCY DEPT VISIT HI MDM: CPT

## 2019-06-11 PROCEDURE — 86403 PARTICLE AGGLUT ANTBDY SCRN: CPT

## 2019-06-11 PROCEDURE — 93005 ELECTROCARDIOGRAM TRACING: CPT

## 2019-06-11 PROCEDURE — 2580000003 HC RX 258: Performed by: EMERGENCY MEDICINE

## 2019-06-11 PROCEDURE — 87086 URINE CULTURE/COLONY COUNT: CPT

## 2019-06-11 PROCEDURE — 85025 COMPLETE CBC W/AUTO DIFF WBC: CPT

## 2019-06-11 PROCEDURE — 87510 GARDNER VAG DNA DIR PROBE: CPT

## 2019-06-11 PROCEDURE — 80048 BASIC METABOLIC PNL TOTAL CA: CPT

## 2019-06-11 PROCEDURE — 71045 X-RAY EXAM CHEST 1 VIEW: CPT

## 2019-06-11 PROCEDURE — 87480 CANDIDA DNA DIR PROBE: CPT

## 2019-06-11 PROCEDURE — 87660 TRICHOMONAS VAGIN DIR PROBE: CPT

## 2019-06-11 PROCEDURE — 87491 CHLMYD TRACH DNA AMP PROBE: CPT

## 2019-06-11 PROCEDURE — 81001 URINALYSIS AUTO W/SCOPE: CPT

## 2019-06-11 PROCEDURE — 84703 CHORIONIC GONADOTROPIN ASSAY: CPT

## 2019-06-11 PROCEDURE — 87591 N.GONORRHOEAE DNA AMP PROB: CPT

## 2019-06-11 PROCEDURE — 93005 ELECTROCARDIOGRAM TRACING: CPT | Performed by: EMERGENCY MEDICINE

## 2019-06-11 RX ORDER — TRANEXAMIC ACID 650 1/1
650 TABLET ORAL DAILY
Qty: 20 TABLET | Refills: 0 | Status: SHIPPED | OUTPATIENT
Start: 2019-06-11 | End: 2019-10-17

## 2019-06-11 RX ORDER — 0.9 % SODIUM CHLORIDE 0.9 %
1000 INTRAVENOUS SOLUTION INTRAVENOUS ONCE
Status: COMPLETED | OUTPATIENT
Start: 2019-06-11 | End: 2019-06-11

## 2019-06-11 RX ORDER — IBUPROFEN 800 MG/1
800 TABLET ORAL EVERY 8 HOURS PRN
Qty: 30 TABLET | Refills: 0 | Status: SHIPPED | OUTPATIENT
Start: 2019-06-11 | End: 2019-10-17 | Stop reason: ALTCHOICE

## 2019-06-11 RX ADMIN — SODIUM CHLORIDE 1000 ML: 9 INJECTION, SOLUTION INTRAVENOUS at 09:27

## 2019-06-11 ASSESSMENT — PAIN DESCRIPTION - LOCATION: LOCATION: ABDOMEN

## 2019-06-11 ASSESSMENT — ENCOUNTER SYMPTOMS
RESPIRATORY NEGATIVE: 1
ABDOMINAL PAIN: 1
ALLERGIC/IMMUNOLOGIC NEGATIVE: 1
EYES NEGATIVE: 1

## 2019-06-11 ASSESSMENT — PAIN SCALES - GENERAL: PAINLEVEL_OUTOF10: 6

## 2019-06-11 ASSESSMENT — PAIN DESCRIPTION - DESCRIPTORS: DESCRIPTORS: CRAMPING

## 2019-06-11 ASSESSMENT — PAIN DESCRIPTION - ORIENTATION: ORIENTATION: MID;LOWER

## 2019-06-11 ASSESSMENT — PAIN DESCRIPTION - PAIN TYPE: TYPE: ACUTE PAIN

## 2019-06-11 NOTE — ED PROVIDER NOTES
[address]     Emergency Department     Faculty Note/ Attestation      Pt Name: Sera Herron                                       MRN: [id number]  [place] 2000  Date of evaluation: 6/11/2019  Patients PCP:    ERICK Desai - CNP    Attestation  I performed a history and physical examination of the patient and discussed management with the resident. I reviewed the residents note and agree with the documented findings and plan of care. Any areas of disagreement are noted on the chart. I was personally present for the key portions of any procedures. I have documented in the chart those procedures where I was not present during the key portions. I have reviewed the emergency nurses triage note. I agree with the chief complaint, past medical history, past surgical history, allergies, medications, social and family history as documented unless otherwise noted below. For Physician Assistant/ Nurse Practitioner cases/documentation I have personally evaluated this patient and have completed at least one if not all key elements of the E/M (history, physical exam, and MDM). Additional findings are as noted. Initial Screens:        Gordy Coma Scale  Eye Opening: Spontaneous  Best Verbal Response: Oriented  Best Motor Response: Obeys commands  Gordy Coma Scale Score: 15    Vitals:    Vitals:    06/11/19 0901 06/11/19 0913   BP: (!) 144/93    Pulse: 89    Resp:  15   Temp: 97.9 °F (36.6 °C)    TempSrc: Oral    SpO2: 96%    Weight: 205 lb (93 kg)    Height: 5' 4\" (1.626 m)        Chief Complaint      Chief Complaint   Patient presents with    Vaginal Bleeding     pt states first cycle since taking IUD out last month w/ heavy bleeding    Shortness of Breath    Abdominal Cramping          height is 5' 4\" (1.626 m) and weight is 205 lb (93 kg). Her oral temperature is 97.9 °F (36.6 °C). Her blood pressure is 144/93 (abnormal) and her pulse is 89.  Her respiration is 15 and oxygen saturation is 96%. DIAGNOSTIC RESULTS       RADIOLOGY:   XR CHEST PORTABLE   Final Result   Portable chest within normal limits. LABS:  Labs Reviewed   VAGINITIS DNA PROBE   C.TRACHOMATIS N.GONORRHOEAE DNA   CBC WITH AUTO DIFFERENTIAL   HCG, SERUM, QUALITATIVE   BASIC METABOLIC PANEL   URINE RT REFLEX TO CULTURE         EMERGENCY DEPARTMENT COURSE:     -------------------------       BP: (!) 144/93, Temp: 97.9 °F (36.6 °C), Heart Rate: 89, Resp: 15      Comments            Bruce MD, F.A.C.E.P.   Attending Emergency Physician         Crys Cornell MD  06/11/19 [id number]

## 2019-06-11 NOTE — ED PROVIDER NOTES
[facility] ED  Emergency Department Encounter  EmergencyMedicine Resident     [address]  MRN: [id number]  [place] 2000  Date of evaluation: 6/11/19  PCP:  ERIKC Andrea CNP    CHIEF COMPLAINT       Chief Complaint   Patient presents with    Vaginal Bleeding     pt states first cycle since taking IUD out last month w/ heavy bleeding    Shortness of Breath    Abdominal Cramping       HISTORY OF PRESENT ILLNESS  (Location/Symptom, Timing/Onset, Context/Setting, Quality, Duration, Modifying Factors, Severity.)      Isha Ferreira is a 22 y.o. female who presents with complaints of vaginal bleeding. Patient reports that she had an IUD taken out about last month and had her first menstrual cycle since then yesterday. Patient reports significant bleeding, states that she went through a tampon in about 20 minutes. Patient reports when she woke up this morning she was feeling lightheaded when she stood up and had some shortness of breath surgical OB/GYN who advised her to come into the ED. She also reports some lower abdominal cramping. She denies any nausea or vomiting, denies any diarrhea constipation, denies any urinary complaints. Denies any chest pain, hemoptysis. PAST MEDICAL / SURGICAL / SOCIAL / FAMILY HISTORY      has a past medical history of Anxiety, Bipolar 1 disorder ([address] BV (bacterial vaginosis), Chlamydia, Depression, Diarrhea, Gonorrhea, Migraine, Neurocardiogenic syncope, Obesity, Panic attacks, PFO (patent foramen ovale), PFO (patent foramen ovale), PTSD (post-traumatic stress disorder), Sensory integration disorder, and Trichimoniasis. has a past surgical history that includes Appendectomy; Colonoscopy (8/13/2015); Endoscopy, colon, diagnostic; Tonsillectomy and adenoidectomy; [place] tooth extraction (Bilateral, 11/113/15); intrauterine device insertion (03/15/16); and Sleeve Gastrectomy (2018).     Social History     Socioeconomic History  Marital status: Single     Spouse name: Not on file    Number of children: Not on file    Years of education: Not on file    Highest education level: Not on file   Occupational History    Not on file   Social Needs    Financial resource strain: Not on file    Food insecurity:     Worry: Not on file     Inability: Not on file    Transportation needs:     Medical: Not on file     Non-medical: Not on file   Tobacco Use    Smoking status: Passive Smoke Exposure - Never Smoker    Smokeless tobacco: Never Used   Substance and Sexual Activity    Alcohol use: No     Alcohol/week: 0.0 oz    Drug use: No    Sexual activity: Yes     Birth control/protection: IUD   Lifestyle    Physical activity:     Days per week: Not on file     Minutes per session: Not on file    Stress: Not on file   Relationships    Social connections:     Talks on phone: Not on file     Gets together: Not on file     Attends Restorationist service: Not on file     Active member of club or organization: Not on file     Attends meetings of clubs or organizations: Not on file     Relationship status: Not on file    Intimate partner violence:     Fear of current or ex partner: Not on file     Emotionally abused: Not on file     Physically abused: Not on file     Forced sexual activity: Not on file   Other Topics Concern    Not on file   Social History Narrative    Not on file       Family History   Problem Relation Age of Onset    Diabetes Mother    [place] Other Mother         memory problems    Cancer Maternal Aunt         bone    Brain Cancer Maternal Aunt     Cancer Maternal Grandmother         lung    No Known Problems Father     No Known Problems Maternal Grandfather     No Known Problems Paternal Grandmother     No Known Problems Paternal Grandfather     Other Other         Gallstones, Pancreatitis       Allergies:  Patient has no known allergies.     Home Medications:  Prior to Admission medications    Medication Sig Start Date End Date Taking? Authorizing Provider   ibuprofen (ADVIL;MOTRIN) 800 MG tablet Take 1 tablet by mouth every 8 hours as needed for Pain 6/11/19  Yes Cristo Ponce MD   tranexamic acid (LYSTEDA) 650 MG TABS tablet Take 1 tablet by mouth daily 6/11/19  Yes Cristo Ponce MD   fluticasone (FLONASE) 50 MCG/ACT nasal spray 2 sprays by Nasal route daily 5/16/19   ERICK Youssef CNP   loratadine-pseudoephedrine (CLARITIN-D 24 HOUR)  MG per extended release tablet Take 1 tablet by mouth daily 5/16/19   ERICK Youssef CNP   cariprazine hcl (VRAYLAR) 1.5 MG capsule Take 1 capsule by mouth daily 5/16/19   ERICK Youssef CNP   norelgestromin-ethinyl estradiol (ORTHO EVRA) 150-35 MCG/24HR Place 1 patch onto the skin every 7 days 5/14/19   Sara Duran MD   tranexamic acid (LYSTEDA) 650 MG TABS tablet Take 1 tablet by mouth 3 times daily for 5 days 4/16/19 4/21/19  Sara Duran MD   CARBONYL IRON PO Take 1 tablet by mouth 12/19/18   Historical Provider, MD   famotidine (PEPCID) 20 MG tablet Take 20 mg by mouth 2/21/19 3/23/19  Historical Provider, MD   sucralfate (CARAFATE) 1 GM/10ML suspension Take 1,000 mg by mouth 2/21/19   Historical Provider, MD   ursodiol (ACTIGALL) 500 MG tablet Take 500 mg by mouth 12/19/18   Historical Provider, MD   albuterol sulfate HFA (VENTOLIN HFA) 108 (90 Base) MCG/ACT inhaler Inhale 2 puffs into the lungs every 4 hours as needed for Wheezing or Shortness of Breath 10/4/18   ERICK Youssef CNP   risperiDONE (RISPERDAL) 1 MG tablet take 1 tablet by mouth daily at bedtime 6/21/18   Historical Provider, MD   lamoTRIgine (LAMICTAL) 25 MG tablet Take 25 mg by mouth daily    Historical Provider, MD       REVIEW OF SYSTEMS    (2-9 systems for level 4, 10 or more for level 5)      Review of Systems   Constitutional: Negative. HENT: Negative. Eyes: Negative. Respiratory: Negative. Cardiovascular: Negative.     Gastrointestinal: Positive for abdominal pain. Endocrine: Negative. Genitourinary: Positive for vaginal bleeding. Musculoskeletal: Negative. Skin: Negative. Allergic/Immunologic: Negative. Neurological: Positive for light-headedness. Hematological: Negative. Psychiatric/Behavioral: Negative. PHYSICAL EXAM   (up to 7 for level 4, 8 or more for level 5)      INITIAL VITALS:   BP (!) 144/93   Pulse 89   Temp 97.9 °F (36.6 °C) (Oral)   Resp 15   Ht 5' 4\" (1.626 m)   Wt 205 lb (93 kg)   LMP 06/10/2019 (Exact Date)   SpO2 96%   BMI 35.19 kg/m²     Physical Exam   Constitutional: She is oriented to person, place, and time. HENT:   Head: Normocephalic and atraumatic. Right Ear: External ear normal.   Left Ear: External ear normal.   Eyes: Pupils are equal, round, and reactive to light. EOM are normal. Right eye exhibits no discharge. Left eye exhibits no discharge. Neck: Neck supple. No JVD present. No tracheal deviation present. Cardiovascular: Normal rate, regular rhythm and normal heart sounds. Exam reveals no gallop and no friction rub. No murmur heard. Pulmonary/Chest: No respiratory distress. She has no wheezes. She exhibits no tenderness. Abdominal: Soft. Bowel sounds are normal. She exhibits no distension. There is tenderness in the suprapubic area. There is no rigidity, no rebound, no guarding and no CVA tenderness. Musculoskeletal: Normal range of motion. She exhibits no edema or deformity. Lymphadenopathy:     She has no cervical adenopathy. Neurological: She is alert and oriented to person, place, and time. No cranial nerve deficit. She exhibits normal muscle tone. Skin: Skin is warm and dry. No rash noted. She is not diaphoretic. Nursing note and vitals reviewed.       DIFFERENTIAL  DIAGNOSIS     PLAN (LABS / IMAGING / EKG):  Orders Placed This Encounter   Procedures    VAGINITIS DNA PROBE    C.trachomatis N.gonorrhoeae DNA    Urine Culture    XR CHEST PORTABLE    CBC WITH AUTO DIFFERENTIAL    BASIC METABOLIC PANEL    Urinalysis Reflex to Culture    HCG Qualitative, Serum    Microscopic Urinalysis    EKG 12 Lead       MEDICATIONS ORDERED:  Orders Placed This Encounter   Medications    0.9 % sodium chloride bolus    ibuprofen (ADVIL;MOTRIN) 800 MG tablet     Sig: Take 1 tablet by mouth every 8 hours as needed for Pain     Dispense:  30 tablet     Refill:  0    tranexamic acid (LYSTEDA) 650 MG TABS tablet     Sig: Take 1 tablet by mouth daily     Dispense:  20 tablet     Refill:  0         DIAGNOSTIC RESULTS / EMERGENCY DEPARTMENT COURSE / MDM     LABS:  Results for orders placed or performed during the hospital encounter of 06/11/19   VAGINITIS DNA PROBE   Result Value Ref Range    Specimen Description . VAGINA     Special Requests NOT REPORTED     Direct Exam NEGATIVE for Candida sp. Direct Exam NEGATIVE for Gardnerella vaginalis     Direct Exam NEGATIVE for Trichomonas vaginalis     Direct Exam       Method of testing is a DNA probe intended for detection and identification of Candida species, Gardnerella vaginalis, and Trichomonas vaginalis nucleic acid in vaginal fluid specimens from patients with symptoms of vaginitis/vaginosis.    CBC WITH AUTO DIFFERENTIAL   Result Value Ref Range    WBC 7.2 4.5 - 13.5 k/uL    RBC 5.04 3.95 - 5.11 m/uL    Hemoglobin 14.1 11.9 - 15.1 g/dL    Hematocrit 43.4 36.3 - 47.1 %    MCV 86.1 82.6 - 102.9 fL    MCH 28.0 25.2 - 33.5 pg    MCHC 32.5 28.4 - 34.8 g/dL    RDW 13.6 11.8 - 14.4 %    Platelets 601 342 - 865 k/uL    MPV 11.4 8.1 - 13.5 fL    NRBC Automated 0.0 0.0 per 100 WBC    Differential Type NOT REPORTED     Seg Neutrophils 64 34 - 64 %    Lymphocytes 28 25 - 45 %    Monocytes 7 2 - 8 %    Eosinophils % 1 1 - 4 %    Basophils 0 0 - 2 %    Immature Granulocytes 0 0 %    Segs Absolute 4.60 1.80 - 8.00 k/uL    Absolute Lymph # 2.05 1.20 - 5.20 k/uL    Absolute Mono # 0.49 0.10 - 1.40 k/uL    Absolute Eos # 0.04 0.00 - 0.44 k/uL    Basophils # 0.03 0.00 - 0.20 k/uL    Absolute Immature Granulocyte <0.03 0.00 - 0.30 k/uL    WBC Morphology NOT REPORTED     RBC Morphology NOT REPORTED     Platelet Estimate NOT REPORTED    BASIC METABOLIC PANEL   Result Value Ref Range    Glucose 95 70 - 99 mg/dL    BUN 11 6 - 20 mg/dL    CREATININE 0.60 0.50 - 0.90 mg/dL    Bun/Cre Ratio NOT REPORTED 9 - 20    Calcium 9.3 8.6 - 10.4 mg/dL    Sodium 143 135 - 144 mmol/L    Potassium 4.3 3.7 - 5.3 mmol/L    Chloride 108 (H) 98 - 107 mmol/L    CO2 23 20 - 31 mmol/L    Anion Gap 12 9 - 17 mmol/L    GFR Non-African American  >60 mL/min     Pediatric GFR requires additional information. Refer to [facility] website for calculator. GFR  NOT REPORTED >60 mL/min    GFR Comment          GFR Staging NOT REPORTED    Urinalysis Reflex to Culture   Result Value Ref Range    Color, UA RED (A) YELLOW    Turbidity UA CLOUDY (A) CLEAR    Glucose, Ur NEGATIVE NEGATIVE    Bilirubin Urine NEGATIVE NEGATIVE    Ketones, Urine TRACE (A) NEGATIVE    Specific Gravity, UA 1.017 1.005 - 1.030    Urine Hgb LARGE (A) NEGATIVE    pH, UA 7.0 5.0 - 8.0    Protein, UA 1+ (A) NEGATIVE    Urobilinogen, Urine Normal Normal    Nitrite, Urine NEGATIVE NEGATIVE    Leukocyte Esterase, Urine SMALL (A) NEGATIVE    Urinalysis Comments NOT REPORTED    HCG Qualitative, Serum   Result Value Ref Range    hCG Qual NEGATIVE NEGATIVE   Microscopic Urinalysis   Result Value Ref Range    -          WBC, UA 2 TO 5 0 - 5 /HPF    RBC, UA TOO NUMEROUS TO COUNT 0 - 4 /HPF    Casts UA  0 - 8 /LPF     0 TO 2 HYALINE Reference range defined for non-centrifuged specimen. Crystals UA NOT REPORTED None /HPF    Epithelial Cells UA 0 TO 2 0 - 5 /HPF    Renal Epithelial, Urine NOT REPORTED 0 /HPF    Bacteria, UA NOT REPORTED None    Mucus, UA NOT REPORTED None    Trichomonas, UA NOT REPORTED None    Amorphous, UA NOT REPORTED None    Other Observations UA NOT REPORTED NOT REQ.     Yeast, UA NOT REPORTED None IMPRESSION: Patient presents to ED with complaints of pubic cramping, vaginal bleeding. Patient reports that she had an IUD removed about a month ago and this is her first period since then. She also reports associated lightheadedness when she stands up. Vital signs unremarkable. Physical exam, there is mild tenderness palpation over the suprapubic region with no guarding, no rigidity. Pelvic exam is negative for any acute findings, dried blood noted in the anal vault but no active bleeding at this time, no significant drainage or discharge from the cervical region. No adnexal tenderness or fullness noted. Will obtain CBC, BMP, CG, vaginitis panel, check for gonorrhea, chlamydia, IV fluids, reassess. Xr Chest Portable    Result Date: 6/11/2019  EXAMINATION: ONE XRAY VIEW OF THE CHEST 6/11/2019 9:27 am COMPARISON: None. HISTORY: ORDERING SYSTEM PROVIDED HISTORY: SOB TECHNOLOGIST PROVIDED HISTORY: SOB Ordering Physician Provided Reason for Exam: Shortness of breath this AM only/AP erect/ GP used Acuity: Acute Type of Exam: Initial FINDINGS: Cardiac silhouette is within normal limits. No pulmonary venous congestion or edema. No convincing lung consolidation or infiltrate. No pleural effusion or pneumothorax. Osseous structures are grossly intact. Portable chest within normal limits. EKG  EKG Interpretation    Rhythm: normal sinus   Rate: normal  Axis: normal  Conduction: normal  ST Segments: normal  T Waves: normal  Q Waves: normal    Clinical Impression: Normal EKG. Trish Miller MD      All EKG's are interpreted by the Emergency Department Physician who either signs or Co-signs this chart in the absence of a cardiologist.    EMERGENCY DEPARTMENT COURSE:  11:34 AM  Pelvic exam was negative for any active bleeding at this time. Lab work was unremarkable. Patient's vitals remained stable.   Patient discharged with prescriptions for TXA, instructed to follow-up with OB/GYN doctor. Patient verbalized understanding and is in agreement with this plan. PROCEDURES:  None    CONSULTS:  None    CRITICAL CARE:  None    FINAL IMPRESSION      1.  Vaginal bleeding          DISPOSITION / PLAN     DISPOSITION        PATIENT REFERRED TO:  Fredrick Soler, APRN - CNP  [address] [place] [id number] [phone]    Schedule an appointment as soon as possible for a visit   For Follow up appointment    [facility]  [address] [place] [address]  [phone]  Go to   As needed, If symptoms worsen      DISCHARGE MEDICATIONS:  New Prescriptions    IBUPROFEN (ADVIL;MOTRIN) 800 MG TABLET    Take 1 tablet by mouth every 8 hours as needed for Pain    TRANEXAMIC ACID (LYSTEDA) 650 MG TABS TABLET    Take 1 tablet by mouth daily       Nolberto Aguilar MD  Emergency Medicine Resident    (Please note that portions of thisnote were completed with a voice recognition program.  Efforts were made to edit the dictations but occasionally words are mis-transcribed.)       Nolberto Aguilar MD  Resident  06/11/19 2792

## 2019-06-11 NOTE — ED NOTES
Labs collected labeled and sent to lab. Pt provided with warm blanket and updated on poc. Call light within reach, will continue to monitor.       Simon Jennings RN  06/11/19 0926

## 2019-06-11 NOTE — TELEPHONE ENCOUNTER
21 y/o GYN pt calling states she has been having heavy bleeding for 2 days, in tub with bloody water, passing clots in bath tub yesterday, painful cramping, today dripping blood across floor of her bathroom, feeling dizzy and shortness of breath. Pain 7/10 and is unsure what she can take for pain because she is a bariatric pt. Advised pt to go to [facility] and to have someone  her.  Pt states she is going to have her grandfather take her to [facility].

## 2019-06-11 NOTE — ED NOTES
Pt updated with plan to discharge home and to f/u with OB/GYN.  Pt agreeable to plan     Nirav Maurer RN  06/11/19 [id number]

## 2019-06-11 NOTE — ED NOTES
Pt ambulatory to bathroom with steady gait. No distress noted.  Urine cup provided for specimen collection     Neris Dias RN  06/11/19 [id number]

## 2019-06-11 NOTE — TELEPHONE ENCOUNTER
In general two or three doses of Motrin are fine for bariatric surgery patients. The other option is Tranexamic acid. She had her IUD removed for irregular bleeding.

## 2019-06-12 LAB
CULTURE: ABNORMAL
EKG ATRIAL RATE: 83 BPM
EKG P AXIS: 55 DEGREES
EKG P-R INTERVAL: 122 MS
EKG Q-T INTERVAL: 374 MS
EKG QRS DURATION: 86 MS
EKG QTC CALCULATION (BAZETT): 439 MS
EKG R AXIS: 60 DEGREES
EKG T AXIS: 60 DEGREES
EKG VENTRICULAR RATE: 83 BPM
Lab: ABNORMAL
SPECIMEN DESCRIPTION: ABNORMAL

## 2019-06-12 PROCEDURE — 93010 ELECTROCARDIOGRAM REPORT: CPT | Performed by: INTERNAL MEDICINE

## 2019-08-20 ENCOUNTER — OFFICE VISIT (OUTPATIENT)
Dept: FAMILY MEDICINE CLINIC | Age: 19
End: 2019-08-20
Payer: COMMERCIAL

## 2019-08-20 VITALS
BODY MASS INDEX: 31.76 KG/M2 | WEIGHT: 186 LBS | DIASTOLIC BLOOD PRESSURE: 82 MMHG | TEMPERATURE: 97.8 F | HEART RATE: 84 BPM | SYSTOLIC BLOOD PRESSURE: 132 MMHG | OXYGEN SATURATION: 98 % | HEIGHT: 64 IN

## 2019-08-20 DIAGNOSIS — J01.00 ACUTE NON-RECURRENT MAXILLARY SINUSITIS: Primary | ICD-10-CM

## 2019-08-20 DIAGNOSIS — R05.9 COUGH: ICD-10-CM

## 2019-08-20 PROCEDURE — 99213 OFFICE O/P EST LOW 20 MIN: CPT | Performed by: NURSE PRACTITIONER

## 2019-08-20 PROCEDURE — 1036F TOBACCO NON-USER: CPT | Performed by: NURSE PRACTITIONER

## 2019-08-20 PROCEDURE — G8427 DOCREV CUR MEDS BY ELIG CLIN: HCPCS | Performed by: NURSE PRACTITIONER

## 2019-08-20 PROCEDURE — G8417 CALC BMI ABV UP PARAM F/U: HCPCS | Performed by: NURSE PRACTITIONER

## 2019-08-20 RX ORDER — AMOXICILLIN 875 MG/1
875 TABLET, COATED ORAL 2 TIMES DAILY
Qty: 20 TABLET | Refills: 0 | Status: SHIPPED | OUTPATIENT
Start: 2019-08-20 | End: 2019-08-30

## 2019-08-20 ASSESSMENT — ENCOUNTER SYMPTOMS
SHORTNESS OF BREATH: 0
RHINORRHEA: 1
COUGH: 1
TROUBLE SWALLOWING: 0
CHEST TIGHTNESS: 1
WHEEZING: 0
NAUSEA: 0
ABDOMINAL PAIN: 0
VOMITING: 0
SINUS PAIN: 1
SINUS PRESSURE: 1
SORE THROAT: 0

## 2019-08-30 ENCOUNTER — PATIENT MESSAGE (OUTPATIENT)
Dept: FAMILY MEDICINE CLINIC | Age: 19
End: 2019-08-30

## 2019-09-03 DIAGNOSIS — H69.83 EUSTACHIAN TUBE DYSFUNCTION, BILATERAL: Primary | ICD-10-CM

## 2019-09-05 ENCOUNTER — OFFICE VISIT (OUTPATIENT)
Dept: FAMILY MEDICINE CLINIC | Age: 19
End: 2019-09-05
Payer: COMMERCIAL

## 2019-09-05 VITALS
DIASTOLIC BLOOD PRESSURE: 78 MMHG | BODY MASS INDEX: 30.73 KG/M2 | SYSTOLIC BLOOD PRESSURE: 112 MMHG | HEART RATE: 81 BPM | RESPIRATION RATE: 18 BRPM | HEIGHT: 64 IN | TEMPERATURE: 97.8 F | WEIGHT: 180 LBS | OXYGEN SATURATION: 98 %

## 2019-09-05 DIAGNOSIS — S99.922A FOOT INJURY, LEFT, INITIAL ENCOUNTER: ICD-10-CM

## 2019-09-05 DIAGNOSIS — S90.32XA CONTUSION OF LEFT FOOT, INITIAL ENCOUNTER: Primary | ICD-10-CM

## 2019-09-05 PROCEDURE — 99213 OFFICE O/P EST LOW 20 MIN: CPT | Performed by: NURSE PRACTITIONER

## 2019-09-05 PROCEDURE — G8427 DOCREV CUR MEDS BY ELIG CLIN: HCPCS | Performed by: NURSE PRACTITIONER

## 2019-09-05 PROCEDURE — 1036F TOBACCO NON-USER: CPT | Performed by: NURSE PRACTITIONER

## 2019-09-05 PROCEDURE — G8417 CALC BMI ABV UP PARAM F/U: HCPCS | Performed by: NURSE PRACTITIONER

## 2019-09-05 RX ORDER — CYANOCOBALAMIN 1000 UG/ML
1000 INJECTION INTRAMUSCULAR; SUBCUTANEOUS ONCE
COMMUNITY

## 2019-09-05 ASSESSMENT — ENCOUNTER SYMPTOMS
SHORTNESS OF BREATH: 0
RESPIRATORY NEGATIVE: 1
WHEEZING: 0
COUGH: 0
COLOR CHANGE: 1
CHEST TIGHTNESS: 0

## 2019-09-05 ASSESSMENT — PATIENT HEALTH QUESTIONNAIRE - PHQ9
2. FEELING DOWN, DEPRESSED OR HOPELESS: 1
1. LITTLE INTEREST OR PLEASURE IN DOING THINGS: 1
SUM OF ALL RESPONSES TO PHQ9 QUESTIONS 1 & 2: 2
SUM OF ALL RESPONSES TO PHQ QUESTIONS 1-9: 2
SUM OF ALL RESPONSES TO PHQ QUESTIONS 1-9: 2

## 2019-09-05 NOTE — PROGRESS NOTES
onto the skin every 7 days 3 patch 3    CARBONYL IRON PO Take 1 tablet by mouth      albuterol sulfate HFA (VENTOLIN HFA) 108 (90 Base) MCG/ACT inhaler Inhale 2 puffs into the lungs every 4 hours as needed for Wheezing or Shortness of Breath 1 Inhaler 0    tranexamic acid (LYSTEDA) 650 MG TABS tablet Take 1 tablet by mouth daily (Patient not taking: Reported on 9/5/2019) 20 tablet 0    cariprazine hcl (VRAYLAR) 1.5 MG capsule Take 1 capsule by mouth daily (Patient not taking: Reported on 9/5/2019) 30 capsule 3    tranexamic acid (LYSTEDA) 650 MG TABS tablet Take 1 tablet by mouth 3 times daily for 5 days 15 tablet 0    famotidine (PEPCID) 20 MG tablet Take 20 mg by mouth      sucralfate (CARAFATE) 1 GM/10ML suspension Take 1,000 mg by mouth      ursodiol (ACTIGALL) 500 MG tablet Take 500 mg by mouth      risperiDONE (RISPERDAL) 1 MG tablet take 1 tablet by mouth daily at bedtime  0    lamoTRIgine (LAMICTAL) 25 MG tablet Take 25 mg by mouth daily       Current Facility-Administered Medications   Medication Dose Route Frequency Provider Last Rate Last Dose    Levonorgestrel (KYLEENA) IUD 19.5 mg 1 each  19.5 mg Intrauterine Continuous Jocelyne Adan MD   1 each at 03/19/19 1412     No Known Allergies    Reviewed PMH, SH, and FH with the patient and updated. Subjective:      Review of Systems   Constitutional: Negative for chills, fatigue and fever. Respiratory: Negative. Negative for cough, chest tightness, shortness of breath and wheezing. Cardiovascular: Negative. Negative for chest pain. Musculoskeletal: Positive for arthralgias (left foot), gait problem (due to left foot pain) and joint swelling (left foot). Skin: Positive for color change (bruising left foot). Negative for rash. Neurological: Positive for numbness (slight). Negative for tingling. All other systems reviewed and are negative. Objective:      Physical Exam   Constitutional: She appears well-developed. No distress.

## 2019-09-11 ENCOUNTER — HOSPITAL ENCOUNTER (OUTPATIENT)
Age: 19
Setting detail: SPECIMEN
Discharge: HOME | End: 2019-09-11
Payer: COMMERCIAL

## 2019-09-11 ENCOUNTER — OFFICE VISIT (OUTPATIENT)
Dept: FAMILY MEDICINE CLINIC | Age: 19
End: 2019-09-11
Payer: COMMERCIAL

## 2019-09-11 VITALS
WEIGHT: 180 LBS | TEMPERATURE: 97.2 F | BODY MASS INDEX: 30.73 KG/M2 | HEIGHT: 64 IN | OXYGEN SATURATION: 98 % | HEART RATE: 78 BPM | DIASTOLIC BLOOD PRESSURE: 68 MMHG | SYSTOLIC BLOOD PRESSURE: 104 MMHG

## 2019-09-11 DIAGNOSIS — Z23 FLU VACCINE NEED: ICD-10-CM

## 2019-09-11 DIAGNOSIS — Z00.00 ROUTINE GENERAL MEDICAL EXAMINATION AT A HEALTH CARE FACILITY: Primary | ICD-10-CM

## 2019-09-11 DIAGNOSIS — Z00.00 ROUTINE GENERAL MEDICAL EXAMINATION AT A HEALTH CARE FACILITY: ICD-10-CM

## 2019-09-11 DIAGNOSIS — Z11.1 SCREENING FOR TUBERCULOSIS: ICD-10-CM

## 2019-09-11 LAB
HBV SURFACE AB TITR SER: 12.25 MIU/ML
RUBV IGG SER QL: 4.8 IU/ML

## 2019-09-11 PROCEDURE — 90471 IMMUNIZATION ADMIN: CPT | Performed by: NURSE PRACTITIONER

## 2019-09-11 PROCEDURE — 99395 PREV VISIT EST AGE 18-39: CPT | Performed by: NURSE PRACTITIONER

## 2019-09-11 PROCEDURE — 90686 IIV4 VACC NO PRSV 0.5 ML IM: CPT | Performed by: NURSE PRACTITIONER

## 2019-09-11 PROCEDURE — 86580 TB INTRADERMAL TEST: CPT | Performed by: NURSE PRACTITIONER

## 2019-09-11 ASSESSMENT — ENCOUNTER SYMPTOMS
CHEST TIGHTNESS: 0
SINUS PRESSURE: 0
SORE THROAT: 0
COLOR CHANGE: 0
VOMITING: 0
DIARRHEA: 0
NAUSEA: 0
ABDOMINAL PAIN: 0
CONSTIPATION: 0
COUGH: 0
SHORTNESS OF BREATH: 0
BACK PAIN: 0

## 2019-09-13 ENCOUNTER — PATIENT MESSAGE (OUTPATIENT)
Dept: FAMILY MEDICINE CLINIC | Age: 19
End: 2019-09-13

## 2019-09-13 LAB
MEASLES IMMUNE (IGG): 1.49
MUV IGG SER QL: 5.88
VZV IGG SER QL IA: 1.09

## 2019-09-14 ENCOUNTER — PATIENT MESSAGE (OUTPATIENT)
Dept: FAMILY MEDICINE CLINIC | Age: 19
End: 2019-09-14

## 2019-09-16 ENCOUNTER — PATIENT MESSAGE (OUTPATIENT)
Dept: FAMILY MEDICINE CLINIC | Age: 19
End: 2019-09-16

## 2019-09-16 ENCOUNTER — NURSE ONLY (OUTPATIENT)
Dept: FAMILY MEDICINE CLINIC | Age: 19
End: 2019-09-16
Payer: COMMERCIAL

## 2019-09-16 DIAGNOSIS — Z11.1 SCREENING FOR TUBERCULOSIS: Primary | ICD-10-CM

## 2019-09-16 LAB
INDURATION: NORMAL
TB SKIN TEST: NORMAL

## 2019-09-16 PROCEDURE — 86580 TB INTRADERMAL TEST: CPT | Performed by: NURSE PRACTITIONER

## 2019-09-18 ENCOUNTER — NURSE ONLY (OUTPATIENT)
Dept: FAMILY MEDICINE CLINIC | Age: 19
End: 2019-09-18

## 2019-09-18 DIAGNOSIS — Z11.1 ENCOUNTER FOR PPD SKIN TEST READING: Primary | ICD-10-CM

## 2019-09-18 LAB
INDURATION: NORMAL
TB SKIN TEST: NEGATIVE

## 2019-09-25 ENCOUNTER — OFFICE VISIT (OUTPATIENT)
Dept: FAMILY MEDICINE CLINIC | Age: 19
End: 2019-09-25
Payer: COMMERCIAL

## 2019-09-25 VITALS — HEIGHT: 64 IN | BODY MASS INDEX: 30.9 KG/M2

## 2019-09-25 DIAGNOSIS — Z11.1 SCREENING FOR TUBERCULOSIS: Primary | ICD-10-CM

## 2019-09-25 PROCEDURE — 86580 TB INTRADERMAL TEST: CPT | Performed by: NURSE PRACTITIONER

## 2019-09-25 NOTE — PROGRESS NOTES
Visit Information    Have you changed or started any medications since your last visit including any over-the-counter medicines, vitamins, or herbal medicines? no   Have you stopped taking any of your medications? Is so, why? -  no  Are you having any side effects from any of your medications? - no    Have you seen any other physician or provider since your last visit?  no   Have you had any other diagnostic tests since your last visit?  no   Have you been seen in the emergency room and/or had an admission in a hospital since we last saw you?  no   Have you had your routine dental cleaning in the past 6 months?  no     Do you have an active MyChart account? If no, what is the barrier?   Yes    Patient Care Team:  ERICK Hines CNP as PCP - [place] [address], APRN - CNP as PCP - [facility] Provider    Medical History Review  Past Medical, Family, and Social History reviewed and  contribute to the patient presenting condition    Health Maintenance   Topic Date Due    Chlamydia screen  06/11/2020    DTaP/Tdap/Td vaccine (3 - Td) 11/12/2025    Flu vaccine  Completed    HIV screen  Completed    HPV vaccine  Addressed    Varicella Vaccine  Addressed    Meningococcal (ACWY) Vaccine  Addressed    Pneumococcal 0-64 years Vaccine  Aged Out

## 2019-09-27 ENCOUNTER — NURSE ONLY (OUTPATIENT)
Dept: FAMILY MEDICINE CLINIC | Age: 19
End: 2019-09-27

## 2019-09-27 DIAGNOSIS — Z11.1 ENCOUNTER FOR PPD SKIN TEST READING: Primary | ICD-10-CM

## 2019-09-27 LAB
INDURATION: NORMAL
TB SKIN TEST: NEGATIVE

## 2019-09-27 NOTE — PROGRESS NOTES
PPD Reading Note  PPD read and results entered in [employer]. Result: 0 mm induration.   Interpretation: Negative  If test not read within 48-72 hours of initial placement, patient advised to repeat in other arm 1-3 weeks after this test.  Allergic reaction: no

## 2019-10-17 ENCOUNTER — OFFICE VISIT (OUTPATIENT)
Dept: FAMILY MEDICINE CLINIC | Age: 19
End: 2019-10-17
Payer: MEDICAID

## 2019-10-17 ENCOUNTER — HOSPITAL ENCOUNTER (OUTPATIENT)
Age: 19
Setting detail: SPECIMEN
Discharge: HOME | End: 2019-10-17
Payer: MEDICAID

## 2019-10-17 VITALS
OXYGEN SATURATION: 96 % | WEIGHT: 174 LBS | HEIGHT: 64 IN | TEMPERATURE: 98.2 F | BODY MASS INDEX: 29.71 KG/M2 | SYSTOLIC BLOOD PRESSURE: 114 MMHG | DIASTOLIC BLOOD PRESSURE: 80 MMHG | HEART RATE: 105 BPM

## 2019-10-17 DIAGNOSIS — F41.9 ANXIETY: ICD-10-CM

## 2019-10-17 DIAGNOSIS — R00.2 HEART PALPITATIONS: ICD-10-CM

## 2019-10-17 DIAGNOSIS — R00.2 HEART PALPITATIONS: Primary | ICD-10-CM

## 2019-10-17 PROBLEM — E73.9 LACTOSE INTOLERANCE: Status: ACTIVE | Noted: 2017-06-14

## 2019-10-17 PROBLEM — F31.9 DEPRESSED BIPOLAR I DISORDER (HCC): Status: ACTIVE | Noted: 2018-04-19

## 2019-10-17 PROBLEM — H52.203 MYOPIA OF BOTH EYES WITH ASTIGMATISM: Status: ACTIVE | Noted: 2018-07-09

## 2019-10-17 PROBLEM — H53.021 REFRACTIVE AMBLYOPIA OF RIGHT EYE: Status: ACTIVE | Noted: 2018-07-09

## 2019-10-17 PROBLEM — H52.13 MYOPIA OF BOTH EYES WITH ASTIGMATISM: Status: ACTIVE | Noted: 2018-07-09

## 2019-10-17 LAB
HCT VFR BLD CALC: 45.2 % (ref 36.3–47.1)
HEMOGLOBIN: 14.8 G/DL (ref 11.9–15.1)
MCH RBC QN AUTO: 27.7 PG (ref 25.2–33.5)
MCHC RBC AUTO-ENTMCNC: 32.7 G/DL (ref 28.4–34.8)
MCV RBC AUTO: 84.6 FL (ref 82.6–102.9)
NRBC AUTOMATED: 0 PER 100 WBC
PDW BLD-RTO: 13.3 % (ref 11.8–14.4)
PLATELET # BLD: 267 K/UL (ref 138–453)
PMV BLD AUTO: 12.6 FL (ref 8.1–13.5)
RBC # BLD: 5.34 M/UL (ref 3.95–5.11)
WBC # BLD: 12.1 K/UL (ref 4.5–13.5)

## 2019-10-17 PROCEDURE — G8427 DOCREV CUR MEDS BY ELIG CLIN: HCPCS | Performed by: NURSE PRACTITIONER

## 2019-10-17 PROCEDURE — G8417 CALC BMI ABV UP PARAM F/U: HCPCS | Performed by: NURSE PRACTITIONER

## 2019-10-17 PROCEDURE — G8482 FLU IMMUNIZE ORDER/ADMIN: HCPCS | Performed by: NURSE PRACTITIONER

## 2019-10-17 PROCEDURE — 99213 OFFICE O/P EST LOW 20 MIN: CPT | Performed by: NURSE PRACTITIONER

## 2019-10-17 PROCEDURE — 1036F TOBACCO NON-USER: CPT | Performed by: NURSE PRACTITIONER

## 2019-10-17 ASSESSMENT — ENCOUNTER SYMPTOMS
CHEST TIGHTNESS: 0
SHORTNESS OF BREATH: 0

## 2019-10-18 LAB
ANION GAP SERPL CALCULATED.3IONS-SCNC: 12 MMOL/L (ref 9–17)
BUN BLDV-MCNC: 14 MG/DL (ref 6–20)
BUN/CREAT BLD: NORMAL (ref 9–20)
CALCIUM SERPL-MCNC: 9.1 MG/DL (ref 8.6–10.4)
CHLORIDE BLD-SCNC: 106 MMOL/L (ref 98–107)
CO2: 22 MMOL/L (ref 20–31)
CORTISOL COLLECTION INFO: NORMAL
CORTISOL: 6.4 UG/DL (ref 2.7–18.4)
CREAT SERPL-MCNC: 0.63 MG/DL (ref 0.5–0.9)
GFR AFRICAN AMERICAN: >60 ML/MIN
GFR NON-AFRICAN AMERICAN: >60 ML/MIN
GFR SERPL CREATININE-BSD FRML MDRD: NORMAL ML/MIN/{1.73_M2}
GFR SERPL CREATININE-BSD FRML MDRD: NORMAL ML/MIN/{1.73_M2}
GLUCOSE BLD-MCNC: 79 MG/DL (ref 70–99)
POTASSIUM SERPL-SCNC: 4.2 MMOL/L (ref 3.7–5.3)
SODIUM BLD-SCNC: 140 MMOL/L (ref 135–144)
TSH SERPL DL<=0.05 MIU/L-ACNC: 2.56 MIU/L (ref 0.3–5)

## 2019-10-23 ENCOUNTER — HOSPITAL ENCOUNTER (OUTPATIENT)
Dept: NON INVASIVE DIAGNOSTICS | Age: 19
Discharge: HOME | End: 2019-10-23
Payer: MEDICAID

## 2019-10-23 DIAGNOSIS — R00.2 HEART PALPITATIONS: ICD-10-CM

## 2019-10-23 PROCEDURE — 93226 XTRNL ECG REC<48 HR SCAN A/R: CPT

## 2019-10-23 PROCEDURE — 93225 XTRNL ECG REC<48 HRS REC: CPT

## 2019-11-07 ENCOUNTER — TELEPHONE (OUTPATIENT)
Dept: FAMILY MEDICINE CLINIC | Age: 19
End: 2019-11-07

## 2019-11-07 ENCOUNTER — OFFICE VISIT (OUTPATIENT)
Dept: FAMILY MEDICINE CLINIC | Age: 19
End: 2019-11-07
Payer: MEDICAID

## 2019-11-07 VITALS
BODY MASS INDEX: 29.19 KG/M2 | DIASTOLIC BLOOD PRESSURE: 80 MMHG | OXYGEN SATURATION: 98 % | TEMPERATURE: 97.1 F | WEIGHT: 171 LBS | HEART RATE: 75 BPM | SYSTOLIC BLOOD PRESSURE: 110 MMHG | HEIGHT: 64 IN

## 2019-11-07 DIAGNOSIS — N76.1 SUBACUTE VAGINITIS: ICD-10-CM

## 2019-11-07 DIAGNOSIS — G43.001 MIGRAINE WITHOUT AURA AND WITH STATUS MIGRAINOSUS, NOT INTRACTABLE: Primary | ICD-10-CM

## 2019-11-07 PROCEDURE — 1036F TOBACCO NON-USER: CPT | Performed by: NURSE PRACTITIONER

## 2019-11-07 PROCEDURE — G8482 FLU IMMUNIZE ORDER/ADMIN: HCPCS | Performed by: NURSE PRACTITIONER

## 2019-11-07 PROCEDURE — 96372 THER/PROPH/DIAG INJ SC/IM: CPT | Performed by: NURSE PRACTITIONER

## 2019-11-07 PROCEDURE — G8427 DOCREV CUR MEDS BY ELIG CLIN: HCPCS | Performed by: NURSE PRACTITIONER

## 2019-11-07 PROCEDURE — 99213 OFFICE O/P EST LOW 20 MIN: CPT | Performed by: NURSE PRACTITIONER

## 2019-11-07 PROCEDURE — G8417 CALC BMI ABV UP PARAM F/U: HCPCS | Performed by: NURSE PRACTITIONER

## 2019-11-07 RX ORDER — KETOROLAC TROMETHAMINE 30 MG/ML
60 INJECTION, SOLUTION INTRAMUSCULAR; INTRAVENOUS ONCE
Status: COMPLETED | OUTPATIENT
Start: 2019-11-07 | End: 2019-11-07

## 2019-11-07 RX ORDER — DIPHENHYDRAMINE HCL 25 MG
25 TABLET ORAL NIGHTLY PRN
Qty: 30 TABLET | Refills: 1 | Status: SHIPPED | OUTPATIENT
Start: 2019-11-07 | End: 2019-12-07

## 2019-11-07 RX ORDER — CALCIUM CARBONATE 300MG(750)
1 TABLET,CHEWABLE ORAL EVERY EVENING
Qty: 30 TABLET | Refills: 1 | Status: SHIPPED | OUTPATIENT
Start: 2019-11-07

## 2019-11-07 RX ORDER — ONDANSETRON 4 MG/1
4 TABLET, ORALLY DISINTEGRATING ORAL 3 TIMES DAILY PRN
Qty: 21 TABLET | Refills: 0 | Status: ON HOLD | OUTPATIENT
Start: 2019-11-07 | End: 2022-10-21 | Stop reason: HOSPADM

## 2019-11-07 RX ORDER — METRONIDAZOLE 500 MG/1
500 TABLET ORAL 2 TIMES DAILY
Qty: 14 TABLET | Refills: 0 | Status: SHIPPED | OUTPATIENT
Start: 2019-11-07 | End: 2019-11-14

## 2019-11-07 RX ADMIN — KETOROLAC TROMETHAMINE 60 MG: 30 INJECTION, SOLUTION INTRAMUSCULAR; INTRAVENOUS at 15:36

## 2019-11-08 PROBLEM — R00.2 PALPITATIONS: Status: ACTIVE | Noted: 2019-11-08

## 2019-11-08 ASSESSMENT — ENCOUNTER SYMPTOMS
CHEST TIGHTNESS: 0
ABDOMINAL PAIN: 0
COUGH: 0
SINUS PRESSURE: 0
SHORTNESS OF BREATH: 0
APNEA: 0
VOMITING: 0
SINUS PAIN: 0
RHINORRHEA: 0
NAUSEA: 1
PHOTOPHOBIA: 1

## 2019-11-13 ENCOUNTER — TELEPHONE (OUTPATIENT)
Dept: FAMILY MEDICINE CLINIC | Age: 19
End: 2019-11-13

## 2019-11-13 DIAGNOSIS — G43.001 MIGRAINE WITHOUT AURA AND WITH STATUS MIGRAINOSUS, NOT INTRACTABLE: Primary | ICD-10-CM

## 2019-11-13 RX ORDER — BUTALBITAL, ACETAMINOPHEN AND CAFFEINE 50; 325; 40 MG/1; MG/1; MG/1
1 TABLET ORAL EVERY 12 HOURS PRN
Qty: 60 TABLET | Refills: 0 | Status: ON HOLD | OUTPATIENT
Start: 2019-11-13 | End: 2022-10-21 | Stop reason: HOSPADM

## 2019-11-14 ENCOUNTER — TELEPHONE (OUTPATIENT)
Dept: FAMILY MEDICINE CLINIC | Age: 19
End: 2019-11-14

## 2019-11-25 ENCOUNTER — OFFICE VISIT (OUTPATIENT)
Dept: FAMILY MEDICINE CLINIC | Age: 19
End: 2019-11-25
Payer: MEDICAID

## 2019-11-25 VITALS
TEMPERATURE: 97.2 F | BODY MASS INDEX: 28.34 KG/M2 | OXYGEN SATURATION: 98 % | WEIGHT: 166 LBS | DIASTOLIC BLOOD PRESSURE: 76 MMHG | HEART RATE: 82 BPM | SYSTOLIC BLOOD PRESSURE: 110 MMHG | HEIGHT: 64 IN

## 2019-11-25 DIAGNOSIS — Z00.00 MEDICARE ANNUAL WELLNESS VISIT, INITIAL: Primary | ICD-10-CM

## 2019-11-25 DIAGNOSIS — R00.2 PALPITATIONS: ICD-10-CM

## 2019-11-25 DIAGNOSIS — Z00.00 ROUTINE GENERAL MEDICAL EXAMINATION AT A HEALTH CARE FACILITY: ICD-10-CM

## 2019-11-25 DIAGNOSIS — G43.001 MIGRAINE WITHOUT AURA AND WITH STATUS MIGRAINOSUS, NOT INTRACTABLE: ICD-10-CM

## 2019-11-25 DIAGNOSIS — R07.89 CHEST PAIN, ATYPICAL: ICD-10-CM

## 2019-11-25 DIAGNOSIS — R55 NEUROCARDIOGENIC SYNCOPE: ICD-10-CM

## 2019-11-25 PROCEDURE — 99213 OFFICE O/P EST LOW 20 MIN: CPT | Performed by: NURSE PRACTITIONER

## 2019-11-25 PROCEDURE — G0438 PPPS, INITIAL VISIT: HCPCS | Performed by: NURSE PRACTITIONER

## 2019-11-25 PROCEDURE — G8482 FLU IMMUNIZE ORDER/ADMIN: HCPCS | Performed by: NURSE PRACTITIONER

## 2019-11-25 RX ORDER — TOPIRAMATE 25 MG/1
25 TABLET ORAL 2 TIMES DAILY
COMMUNITY
End: 2019-12-10 | Stop reason: SDUPTHER

## 2019-11-25 ASSESSMENT — ENCOUNTER SYMPTOMS
SHORTNESS OF BREATH: 0
VOMITING: 0
NAUSEA: 0
CHEST TIGHTNESS: 0
ABDOMINAL PAIN: 0
COUGH: 0

## 2019-11-25 ASSESSMENT — PATIENT HEALTH QUESTIONNAIRE - PHQ9
SUM OF ALL RESPONSES TO PHQ QUESTIONS 1-9: 2
SUM OF ALL RESPONSES TO PHQ QUESTIONS 1-9: 2

## 2019-11-25 ASSESSMENT — LIFESTYLE VARIABLES: HOW OFTEN DO YOU HAVE A DRINK CONTAINING ALCOHOL: 0

## 2019-12-02 ENCOUNTER — PATIENT MESSAGE (OUTPATIENT)
Dept: FAMILY MEDICINE CLINIC | Age: 19
End: 2019-12-02

## 2019-12-02 RX ORDER — METRONIDAZOLE 7.5 MG/G
1 GEL VAGINAL DAILY
Qty: 1 TUBE | Refills: 0 | Status: SHIPPED | OUTPATIENT
Start: 2019-12-02 | End: 2019-12-07

## 2019-12-03 ENCOUNTER — TELEPHONE (OUTPATIENT)
Dept: FAMILY MEDICINE CLINIC | Age: 19
End: 2019-12-03

## 2019-12-05 ENCOUNTER — PATIENT MESSAGE (OUTPATIENT)
Dept: FAMILY MEDICINE CLINIC | Age: 19
End: 2019-12-05

## 2019-12-09 ENCOUNTER — PATIENT MESSAGE (OUTPATIENT)
Dept: FAMILY MEDICINE CLINIC | Age: 19
End: 2019-12-09

## 2019-12-10 ENCOUNTER — PATIENT MESSAGE (OUTPATIENT)
Dept: FAMILY MEDICINE CLINIC | Age: 19
End: 2019-12-10

## 2019-12-10 DIAGNOSIS — G43.001 MIGRAINE WITHOUT AURA AND WITH STATUS MIGRAINOSUS, NOT INTRACTABLE: Primary | ICD-10-CM

## 2019-12-10 RX ORDER — TOPIRAMATE 50 MG/1
50 TABLET, FILM COATED ORAL 2 TIMES DAILY
Qty: 60 TABLET | Refills: 5 | Status: ON HOLD | OUTPATIENT
Start: 2019-12-10 | End: 2022-10-21 | Stop reason: HOSPADM

## 2019-12-12 ENCOUNTER — PATIENT MESSAGE (OUTPATIENT)
Dept: FAMILY MEDICINE CLINIC | Age: 19
End: 2019-12-12

## 2019-12-12 ENCOUNTER — NURSE ONLY (OUTPATIENT)
Dept: FAMILY MEDICINE CLINIC | Age: 19
End: 2019-12-12

## 2019-12-12 DIAGNOSIS — M54.2 NECK PAIN: ICD-10-CM

## 2019-12-12 DIAGNOSIS — G43.009 MIGRAINE WITHOUT AURA AND WITHOUT STATUS MIGRAINOSUS, NOT INTRACTABLE: Primary | ICD-10-CM

## 2019-12-12 DIAGNOSIS — G43.001 MIGRAINE WITHOUT AURA AND WITH STATUS MIGRAINOSUS, NOT INTRACTABLE: Primary | ICD-10-CM

## 2019-12-12 DIAGNOSIS — M54.12 CERVICAL RADICULOPATHY: ICD-10-CM

## 2019-12-13 ENCOUNTER — PATIENT MESSAGE (OUTPATIENT)
Dept: FAMILY MEDICINE CLINIC | Age: 19
End: 2019-12-13

## 2019-12-13 RX ORDER — METHOCARBAMOL 750 MG/1
750 TABLET, FILM COATED ORAL 4 TIMES DAILY
Qty: 40 TABLET | Refills: 0 | Status: SHIPPED | OUTPATIENT
Start: 2019-12-13 | End: 2019-12-23

## 2019-12-30 ENCOUNTER — OFFICE VISIT (OUTPATIENT)
Dept: FAMILY MEDICINE CLINIC | Age: 19
End: 2019-12-30
Payer: MEDICAID

## 2019-12-30 VITALS
HEIGHT: 64 IN | TEMPERATURE: 98.5 F | DIASTOLIC BLOOD PRESSURE: 72 MMHG | BODY MASS INDEX: 26.46 KG/M2 | SYSTOLIC BLOOD PRESSURE: 114 MMHG | WEIGHT: 155 LBS | HEART RATE: 91 BPM | OXYGEN SATURATION: 98 %

## 2019-12-30 DIAGNOSIS — J02.0 ACUTE STREPTOCOCCAL PHARYNGITIS: Primary | ICD-10-CM

## 2019-12-30 DIAGNOSIS — J04.0 LARYNGITIS: ICD-10-CM

## 2019-12-30 DIAGNOSIS — R05.9 COUGH: ICD-10-CM

## 2019-12-30 PROBLEM — K59.09 CHRONIC CONSTIPATION: Status: ACTIVE | Noted: 2019-12-03

## 2019-12-30 PROBLEM — G43.909 MIGRAINE SYNDROME: Status: ACTIVE | Noted: 2019-12-05

## 2019-12-30 PROBLEM — Z98.84 H/O GASTRIC BYPASS: Status: ACTIVE | Noted: 2019-12-03

## 2019-12-30 PROCEDURE — G8417 CALC BMI ABV UP PARAM F/U: HCPCS | Performed by: NURSE PRACTITIONER

## 2019-12-30 PROCEDURE — 99213 OFFICE O/P EST LOW 20 MIN: CPT | Performed by: NURSE PRACTITIONER

## 2019-12-30 PROCEDURE — G8427 DOCREV CUR MEDS BY ELIG CLIN: HCPCS | Performed by: NURSE PRACTITIONER

## 2019-12-30 PROCEDURE — 1036F TOBACCO NON-USER: CPT | Performed by: NURSE PRACTITIONER

## 2019-12-30 PROCEDURE — G8482 FLU IMMUNIZE ORDER/ADMIN: HCPCS | Performed by: NURSE PRACTITIONER

## 2019-12-30 RX ORDER — DEXTROMETHORPHAN HYDROBROMIDE AND PROMETHAZINE HYDROCHLORIDE 15; 6.25 MG/5ML; MG/5ML
5 SYRUP ORAL 3 TIMES DAILY PRN
Qty: 150 ML | Refills: 0 | Status: SHIPPED | OUTPATIENT
Start: 2019-12-30 | End: 2020-01-09

## 2019-12-30 RX ORDER — AMOXICILLIN 500 MG/1
500 CAPSULE ORAL 3 TIMES DAILY
Qty: 30 CAPSULE | Refills: 0 | Status: SHIPPED | OUTPATIENT
Start: 2019-12-30 | End: 2020-01-02

## 2019-12-30 ASSESSMENT — ENCOUNTER SYMPTOMS
SORE THROAT: 1
EYE DISCHARGE: 0
COUGH: 1
EYE ITCHING: 0
EYE PAIN: 0
TROUBLE SWALLOWING: 0
CHEST TIGHTNESS: 0
WHEEZING: 0
VOMITING: 0
NAUSEA: 0
VOICE CHANGE: 1
RHINORRHEA: 0
SHORTNESS OF BREATH: 0

## 2020-01-02 ENCOUNTER — OFFICE VISIT (OUTPATIENT)
Dept: FAMILY MEDICINE CLINIC | Age: 20
End: 2020-01-02

## 2020-01-02 VITALS
TEMPERATURE: 98.1 F | HEIGHT: 64 IN | RESPIRATION RATE: 16 BRPM | BODY MASS INDEX: 26.46 KG/M2 | HEART RATE: 98 BPM | SYSTOLIC BLOOD PRESSURE: 102 MMHG | OXYGEN SATURATION: 97 % | WEIGHT: 155 LBS | DIASTOLIC BLOOD PRESSURE: 70 MMHG

## 2020-01-02 PROBLEM — F50.20 BULIMIA NERVOSA (HCC): Status: ACTIVE | Noted: 2020-01-02

## 2020-01-02 PROBLEM — F50.2 BULIMIA NERVOSA: Status: ACTIVE | Noted: 2020-01-02

## 2020-01-02 PROBLEM — F41.1 GENERALIZED ANXIETY DISORDER: Status: ACTIVE | Noted: 2020-01-02

## 2020-01-02 PROBLEM — F31.60 BIPOLAR DISORDER, CURRENT EPISODE MIXED, UNSPECIFIED (HCC): Status: ACTIVE | Noted: 2020-01-02

## 2020-01-02 PROBLEM — F25.0 SCHIZOAFFECTIVE DISORDER, BIPOLAR TYPE (HCC): Status: ACTIVE | Noted: 2020-01-02

## 2020-01-02 PROCEDURE — 99212 OFFICE O/P EST SF 10 MIN: CPT | Performed by: NURSE PRACTITIONER

## 2020-01-02 RX ORDER — FLUDROCORTISONE ACETATE 0.1 MG/1
1 TABLET ORAL
COMMUNITY

## 2020-01-02 RX ORDER — GUAIFENESIN 600 MG/1
600 TABLET, EXTENDED RELEASE ORAL 2 TIMES DAILY
Status: ON HOLD | COMMUNITY
Start: 2020-01-02 | End: 2022-10-21 | Stop reason: HOSPADM

## 2020-01-02 RX ORDER — AZITHROMYCIN 250 MG/1
TABLET, FILM COATED ORAL
Qty: 1 PACKET | Refills: 0 | Status: SHIPPED | OUTPATIENT
Start: 2020-01-02 | End: 2020-01-12

## 2020-01-02 RX ORDER — METHYLPREDNISOLONE 4 MG/1
TABLET ORAL
Qty: 1 KIT | Refills: 0 | Status: SHIPPED | OUTPATIENT
Start: 2020-01-02 | End: 2020-01-08

## 2020-01-02 ASSESSMENT — ENCOUNTER SYMPTOMS
VOMITING: 0
SORE THROAT: 1
ABDOMINAL PAIN: 0
ABDOMINAL DISTENTION: 0
EYE ITCHING: 0
TROUBLE SWALLOWING: 0
NAUSEA: 0
VOICE CHANGE: 0
WHEEZING: 0
SHORTNESS OF BREATH: 0
CHEST TIGHTNESS: 0
EYE DISCHARGE: 0
COUGH: 1
EYE PAIN: 0

## 2020-01-02 NOTE — PATIENT INSTRUCTIONS
care for yourself at home? · If your doctor prescribed antibiotics, take them as directed. Do not stop taking them just because you feel better. You need to take the full course of antibiotics. · Gargle with warm salt water once an hour to help reduce swelling and relieve discomfort. Use 1 teaspoon of salt mixed in 1 cup of warm water. · Take an over-the-counter pain medicine, such as acetaminophen (Tylenol), ibuprofen (Advil, Motrin), or naproxen (Aleve). Read and follow all instructions on the label. · Be careful when taking over-the-counter cold or flu medicines and Tylenol at the same time. Many of these medicines have acetaminophen, which is Tylenol. Read the labels to make sure that you are not taking more than the recommended dose. Too much acetaminophen (Tylenol) can be harmful. · Drink plenty of fluids. Fluids may help soothe an irritated throat. Hot fluids, such as tea or soup, may help decrease throat pain. · Use over-the-counter throat lozenges to soothe pain. Regular cough drops or hard candy may also help. These should not be given to young children because of the risk of choking. · Do not smoke or allow others to smoke around you. If you need help quitting, talk to your doctor about stop-smoking programs and medicines. These can increase your chances of quitting for good. · Use a vaporizer or humidifier to add moisture to your bedroom. Follow the directions for cleaning the machine. When should you call for help? Call your doctor now or seek immediate medical care if:    · You have new or worse trouble swallowing.     · Your sore throat gets much worse on one side.    Watch closely for changes in your health, and be sure to contact your doctor if you do not get better as expected. Where can you learn more? Go to [email or web address] org and sign in to your [employer] account. Enter R737 in the OnTheList box to learn more about \"Sore Throat: Care Instructions. \"

## 2020-01-02 NOTE — PROGRESS NOTES
ERICK Forbes CNP   ondansetron (ZOFRAN-ODT) 4 MG disintegrating tablet Take 1 tablet by mouth 3 times daily as needed for Nausea or Vomiting  ERICK Nicholson CNP   Magnesium 400 MG TABS Take 1 tablet by mouth every evening  ERICK Nicholson CNP   cyanocobalamin 1000 MCG/ML injection Inject 1,000 mcg into the muscle once  Historical Provider, MD   fluticasone (FLONASE) 50 MCG/ACT nasal spray 2 sprays by Nasal route daily  ERICK Nicholson CNP   albuterol sulfate HFA (VENTOLIN HFA) 108 (90 Base) MCG/ACT inhaler Inhale 2 puffs into the lungs every 4 hours as needed for Wheezing or Shortness of Breath  ERICK Nicholson CNP       No Known Allergies      Subjective:      Review of Systems   Constitutional: Positive for chills and fatigue. Negative for fever. HENT: Positive for congestion, ear pain and sore throat. Negative for trouble swallowing and voice change. Eyes: Negative for pain, discharge and itching. Respiratory: Positive for cough (dry). Negative for chest tightness, shortness of breath and wheezing. Cardiovascular: Negative for chest pain, palpitations and leg swelling. Gastrointestinal: Negative for abdominal distention, abdominal pain, nausea and vomiting. Genitourinary: Negative for decreased urine volume and difficulty urinating. Musculoskeletal: Negative for arthralgias, myalgias and neck pain. Skin: Negative for pallor and rash. Neurological: Negative for weakness, light-headedness and headaches. Psychiatric/Behavioral: Negative for sleep disturbance. Objective:     Physical Exam  Vitals signs and nursing note reviewed. Constitutional:       General: She is not in acute distress. HENT:      Head: Normocephalic and atraumatic. Right Ear: A middle ear effusion is present. Left Ear: A middle ear effusion is present. Nose: Congestion present. Right Sinus: No maxillary sinus tenderness or frontal sinus tenderness.       Left Sinus: No maxillary sinus tenderness or frontal sinus tenderness. Mouth/Throat:      Lips: Pink. Mouth: Mucous membranes are moist.      Pharynx: Uvula midline. Posterior oropharyngeal erythema present. No pharyngeal swelling or uvula swelling. Tonsils: Swellin on the right. 0 on the left. Eyes:      Extraocular Movements: Extraocular movements intact. Conjunctiva/sclera: Conjunctivae normal.   Neck:      Musculoskeletal: Normal range of motion and neck supple. Cardiovascular:      Rate and Rhythm: Normal rate and regular rhythm. Pulses: Normal pulses. Pulmonary:      Effort: Pulmonary effort is normal.      Breath sounds: Normal breath sounds. No wheezing or rales. Abdominal:      General: Bowel sounds are normal.      Palpations: Abdomen is soft. Musculoskeletal: Normal range of motion. Lymphadenopathy:      Cervical: No cervical adenopathy. Skin:     General: Skin is warm and dry. Capillary Refill: Capillary refill takes less than 2 seconds. Neurological:      Mental Status: She is alert and oriented to person, place, and time. Psychiatric:         Mood and Affect: Mood normal.         Thought Content: Thought content normal.           MEDICAL DECISION MAKING Assessment/Plan:     Oli Cornejo was seen today for cough and pharyngitis. Diagnoses and all orders for this visit:    Acute bronchitis, unspecified organism  -     methylPREDNISolone (MEDROL DOSEPACK) 4 MG tablet; Take by mouth.  -     guaiFENesin (MUCINEX) 600 MG extended release tablet; Take 1 tablet by mouth 2 times daily    Acute streptococcal pharyngitis  -     azithromycin (ZITHROMAX) 250 MG tablet; Take 2 tablets (500 mg) on Day 1, followed by 1 tablet (250 mg) once daily on Days 2 through 5.     Fluid level behind tympanic membrane of both ears        Results for orders placed or performed during the hospital encounter of 10/17/19   TSH without Reflex   Result Value Ref Range    TSH 2.56 0.30 - 5.00 mIU/L Cortisol Total   Result Value Ref Range    Cortisol 6.4 2.7 - 18.4 ug/dL    Cortisol Collection Info 4 p.m. CBC   Result Value Ref Range    WBC 12.1 4.5 - 13.5 k/uL    RBC 5.34 (H) 3.95 - 5.11 m/uL    Hemoglobin 14.8 11.9 - 15.1 g/dL    Hematocrit 45.2 36.3 - 47.1 %    MCV 84.6 82.6 - 102.9 fL    MCH 27.7 25.2 - 33.5 pg    MCHC 32.7 28.4 - 34.8 g/dL    RDW 13.3 11.8 - 14.4 %    Platelets 080 195 - 248 k/uL    MPV 12.6 8.1 - 13.5 fL    NRBC Automated 0.0 0.0 per 100 WBC   Basic Metabolic Panel   Result Value Ref Range    Glucose 79 70 - 99 mg/dL    BUN 14 6 - 20 mg/dL    CREATININE 0.63 0.50 - 0.90 mg/dL    Bun/Cre Ratio NOT REPORTED 9 - 20    Calcium 9.1 8.6 - 10.4 mg/dL    Sodium 140 135 - 144 mmol/L    Potassium 4.2 3.7 - 5.3 mmol/L    Chloride 106 98 - 107 mmol/L    CO2 22 20 - 31 mmol/L    Anion Gap 12 9 - 17 mmol/L    GFR Non-African American >60 >60 mL/min    GFR African American >60 >60 mL/min    GFR Comment          GFR Staging NOT REPORTED        Patient was seen on 12/30 diagnosed with strep and received prescription for antibiotic and cough medication. She feels symptoms are worse. Will DC Amoxicillin and start Z-pack. Based on exam,  will give prednisone and Mucinex. Cough is non productive, but she feels congested in her chest now. She has no trouble swallowing and trismus. No hot potato voice. Tonsils absent. Pt to rest, increase fluids. Fill and take medications as ordered. May take OTC Motrin/Tylenol as directed on the bottle. Return if no improvement. Go to the ER for any emergent concern. Patient given educational materials - see patientinstructions. Discussed use, benefit, and side effects of prescribed medications. All patient questions answered. Pt verbalized understanding. Instructed to continue current medications, diet and exercise. Patient agreedwith treatment plan. Follow up as directed.      Electronically signed by ERICK Ash CNP on 1/2/2020 at 8:54 AM

## 2022-10-19 ENCOUNTER — HOSPITAL ENCOUNTER (OUTPATIENT)
Age: 22
Setting detail: OBSERVATION
Discharge: HOME | End: 2022-10-21
Attending: EMERGENCY MEDICINE | Admitting: EMERGENCY MEDICINE
Payer: COMMERCIAL

## 2022-10-19 ENCOUNTER — APPOINTMENT (OUTPATIENT)
Dept: CT IMAGING | Age: 22
End: 2022-10-19
Payer: COMMERCIAL

## 2022-10-19 ENCOUNTER — APPOINTMENT (OUTPATIENT)
Dept: MRI IMAGING | Age: 22
End: 2022-10-19
Payer: COMMERCIAL

## 2022-10-19 DIAGNOSIS — R51.9 ACUTE INTRACTABLE HEADACHE, UNSPECIFIED HEADACHE TYPE: Primary | ICD-10-CM

## 2022-10-19 LAB
SARS-COV-2, RAPID: NOT DETECTED
SPECIMEN DESCRIPTION: NORMAL

## 2022-10-19 PROCEDURE — 6360000002 HC RX W HCPCS: Performed by: STUDENT IN AN ORGANIZED HEALTH CARE EDUCATION/TRAINING PROGRAM

## 2022-10-19 PROCEDURE — 70551 MRI BRAIN STEM W/O DYE: CPT

## 2022-10-19 PROCEDURE — 87635 SARS-COV-2 COVID-19 AMP PRB: CPT

## 2022-10-19 PROCEDURE — 93005 ELECTROCARDIOGRAM TRACING: CPT | Performed by: PSYCHIATRY & NEUROLOGY

## 2022-10-19 PROCEDURE — 96361 HYDRATE IV INFUSION ADD-ON: CPT

## 2022-10-19 PROCEDURE — 96376 TX/PRO/DX INJ SAME DRUG ADON: CPT

## 2022-10-19 PROCEDURE — 96375 TX/PRO/DX INJ NEW DRUG ADDON: CPT

## 2022-10-19 PROCEDURE — 6370000000 HC RX 637 (ALT 250 FOR IP): Performed by: FAMILY MEDICINE

## 2022-10-19 PROCEDURE — 6360000002 HC RX W HCPCS

## 2022-10-19 PROCEDURE — 96365 THER/PROPH/DIAG IV INF INIT: CPT

## 2022-10-19 PROCEDURE — 2580000003 HC RX 258: Performed by: STUDENT IN AN ORGANIZED HEALTH CARE EDUCATION/TRAINING PROGRAM

## 2022-10-19 PROCEDURE — G0378 HOSPITAL OBSERVATION PER HR: HCPCS

## 2022-10-19 PROCEDURE — 70450 CT HEAD/BRAIN W/O DYE: CPT

## 2022-10-19 PROCEDURE — 99285 EMERGENCY DEPT VISIT HI MDM: CPT

## 2022-10-19 PROCEDURE — 6360000002 HC RX W HCPCS: Performed by: FAMILY MEDICINE

## 2022-10-19 RX ORDER — 0.9 % SODIUM CHLORIDE 0.9 %
1000 INTRAVENOUS SOLUTION INTRAVENOUS ONCE
Status: COMPLETED | OUTPATIENT
Start: 2022-10-19 | End: 2022-10-19

## 2022-10-19 RX ORDER — SODIUM CHLORIDE 9 MG/ML
25 INJECTION, SOLUTION INTRAVENOUS PRN
Status: DISCONTINUED | OUTPATIENT
Start: 2022-10-19 | End: 2022-10-21 | Stop reason: HOSPADM

## 2022-10-19 RX ORDER — ONDANSETRON 2 MG/ML
4 INJECTION INTRAMUSCULAR; INTRAVENOUS EVERY 6 HOURS PRN
Status: DISCONTINUED | OUTPATIENT
Start: 2022-10-19 | End: 2022-10-21 | Stop reason: HOSPADM

## 2022-10-19 RX ORDER — SODIUM CHLORIDE 9 MG/ML
INJECTION, SOLUTION INTRAVENOUS CONTINUOUS
Status: DISCONTINUED | OUTPATIENT
Start: 2022-10-19 | End: 2022-10-21 | Stop reason: HOSPADM

## 2022-10-19 RX ORDER — ENOXAPARIN SODIUM 100 MG/ML
40 INJECTION SUBCUTANEOUS DAILY
Status: DISCONTINUED | OUTPATIENT
Start: 2022-10-19 | End: 2022-10-21 | Stop reason: HOSPADM

## 2022-10-19 RX ORDER — MORPHINE SULFATE 4 MG/ML
2 INJECTION, SOLUTION INTRAMUSCULAR; INTRAVENOUS ONCE
Status: COMPLETED | OUTPATIENT
Start: 2022-10-19 | End: 2022-10-19

## 2022-10-19 RX ORDER — ALBUTEROL SULFATE 90 UG/1
2 AEROSOL, METERED RESPIRATORY (INHALATION) EVERY 4 HOURS PRN
Status: DISCONTINUED | OUTPATIENT
Start: 2022-10-19 | End: 2022-10-21 | Stop reason: HOSPADM

## 2022-10-19 RX ORDER — ONDANSETRON 4 MG/1
4 TABLET, ORALLY DISINTEGRATING ORAL EVERY 4 HOURS PRN
Status: DISCONTINUED | OUTPATIENT
Start: 2022-10-19 | End: 2022-10-21 | Stop reason: HOSPADM

## 2022-10-19 RX ORDER — KETOROLAC TROMETHAMINE 30 MG/ML
30 INJECTION, SOLUTION INTRAMUSCULAR; INTRAVENOUS EVERY 6 HOURS PRN
Status: DISCONTINUED | OUTPATIENT
Start: 2022-10-19 | End: 2022-10-21 | Stop reason: HOSPADM

## 2022-10-19 RX ORDER — ONDANSETRON 2 MG/ML
4 INJECTION INTRAMUSCULAR; INTRAVENOUS ONCE
Status: COMPLETED | OUTPATIENT
Start: 2022-10-19 | End: 2022-10-19

## 2022-10-19 RX ORDER — DEXAMETHASONE SODIUM PHOSPHATE 10 MG/ML
10 INJECTION INTRAMUSCULAR; INTRAVENOUS ONCE
Status: COMPLETED | OUTPATIENT
Start: 2022-10-19 | End: 2022-10-19

## 2022-10-19 RX ORDER — MAGNESIUM SULFATE IN WATER 40 MG/ML
2000 INJECTION, SOLUTION INTRAVENOUS ONCE
Status: COMPLETED | OUTPATIENT
Start: 2022-10-19 | End: 2022-10-19

## 2022-10-19 RX ORDER — SODIUM CHLORIDE 0.9 % (FLUSH) 0.9 %
5-40 SYRINGE (ML) INJECTION PRN
Status: DISCONTINUED | OUTPATIENT
Start: 2022-10-19 | End: 2022-10-21 | Stop reason: HOSPADM

## 2022-10-19 RX ORDER — TOPIRAMATE 50 MG/1
50 TABLET, FILM COATED ORAL 2 TIMES DAILY
Status: CANCELLED | OUTPATIENT
Start: 2022-10-19

## 2022-10-19 RX ORDER — ONDANSETRON 2 MG/ML
4 INJECTION INTRAMUSCULAR; INTRAVENOUS EVERY 8 HOURS
Status: DISCONTINUED | OUTPATIENT
Start: 2022-10-19 | End: 2022-10-20 | Stop reason: SDUPTHER

## 2022-10-19 RX ORDER — ACETAMINOPHEN 325 MG/1
650 TABLET ORAL EVERY 4 HOURS PRN
Status: DISCONTINUED | OUTPATIENT
Start: 2022-10-19 | End: 2022-10-21 | Stop reason: HOSPADM

## 2022-10-19 RX ORDER — DIHYDROERGOTAMINE MESYLATE 1 MG/ML
0.5 INJECTION, SOLUTION INTRAMUSCULAR; INTRAVENOUS; SUBCUTANEOUS EVERY 8 HOURS
Status: DISCONTINUED | OUTPATIENT
Start: 2022-10-20 | End: 2022-10-20 | Stop reason: SDUPTHER

## 2022-10-19 RX ORDER — METOCLOPRAMIDE HYDROCHLORIDE 5 MG/ML
10 INJECTION INTRAMUSCULAR; INTRAVENOUS ONCE
Status: DISCONTINUED | OUTPATIENT
Start: 2022-10-19 | End: 2022-10-19

## 2022-10-19 RX ORDER — SODIUM CHLORIDE 0.9 % (FLUSH) 0.9 %
5-40 SYRINGE (ML) INJECTION EVERY 12 HOURS SCHEDULED
Status: DISCONTINUED | OUTPATIENT
Start: 2022-10-19 | End: 2022-10-21 | Stop reason: HOSPADM

## 2022-10-19 RX ORDER — KETOROLAC TROMETHAMINE 30 MG/ML
30 INJECTION, SOLUTION INTRAMUSCULAR; INTRAVENOUS ONCE
Status: COMPLETED | OUTPATIENT
Start: 2022-10-19 | End: 2022-10-19

## 2022-10-19 RX ADMIN — MAGNESIUM SULFATE HEPTAHYDRATE 2000 MG: 40 INJECTION, SOLUTION INTRAVENOUS at 14:35

## 2022-10-19 RX ADMIN — KETOROLAC TROMETHAMINE 30 MG: 30 INJECTION, SOLUTION INTRAMUSCULAR at 15:43

## 2022-10-19 RX ADMIN — MORPHINE SULFATE 2 MG: 4 INJECTION INTRAVENOUS at 21:50

## 2022-10-19 RX ADMIN — VERAPAMIL HYDROCHLORIDE 120 MG: 120 TABLET, FILM COATED, EXTENDED RELEASE ORAL at 20:31

## 2022-10-19 RX ADMIN — ONDANSETRON 4 MG: 2 INJECTION INTRAMUSCULAR; INTRAVENOUS at 14:32

## 2022-10-19 RX ADMIN — ONDANSETRON 4 MG: 2 INJECTION INTRAMUSCULAR; INTRAVENOUS at 17:26

## 2022-10-19 RX ADMIN — DEXAMETHASONE SODIUM PHOSPHATE 10 MG: 10 INJECTION INTRAMUSCULAR; INTRAVENOUS at 14:32

## 2022-10-19 RX ADMIN — SODIUM CHLORIDE: 9 INJECTION, SOLUTION INTRAVENOUS at 20:52

## 2022-10-19 RX ADMIN — SODIUM CHLORIDE 1000 ML: 9 INJECTION, SOLUTION INTRAVENOUS at 14:35

## 2022-10-19 ASSESSMENT — PAIN DESCRIPTION - LOCATION: LOCATION: HEAD

## 2022-10-19 ASSESSMENT — ENCOUNTER SYMPTOMS
ABDOMINAL PAIN: 0
NAUSEA: 1
SHORTNESS OF BREATH: 0
BACK PAIN: 0
PHOTOPHOBIA: 1
VOMITING: 0

## 2022-10-19 ASSESSMENT — PAIN DESCRIPTION - DESCRIPTORS: DESCRIPTORS: ACHING

## 2022-10-19 ASSESSMENT — PAIN SCALES - GENERAL
PAINLEVEL_OUTOF10: 7
PAINLEVEL_OUTOF10: 5

## 2022-10-19 ASSESSMENT — PAIN DESCRIPTION - PAIN TYPE: TYPE: ACUTE PAIN

## 2022-10-19 NOTE — ED PROVIDER NOTES
FACULTY SIGN-OUT  ADDENDUM       Patient: Roro Valdovinos   MRN: [id number]  PCP:  ERICK Zaragoza - CNP  Attestation  I was available and discussed any additional care issues that arose and coordinated the management plans with the resident(s) caring for the patient during my duty period. Any areas of disagreement with resident's documentation of care or procedures are noted on the chart. I was personally present for the key portions of any/all procedures during my duty period. I have documented in the chart those procedures where I was not present during the key portions. The patient's initial evaluation and plan have been discussed with the prior provider who initially evaluated the patient. Pertinent Comments: The patient is a 20 y.o. female taken in signout with long history of Chiari malformation relatively recently moved to this area and having recurrent headaches. This headache was not sudden onset and gradual over the last 24 hours but is worse than normal headaches.    Neurologically intact by previous attending  We are awaiting attempted somatic control as well as CT head and reevaluate after    ED COURSE      The patient was given the following medications:  Orders Placed This Encounter   Medications    DISCONTD: metoclopramide (REGLAN) injection 10 mg    0.9 % sodium chloride bolus    magnesium sulfate 2000 mg in 50 mL IVPB premix    dexamethasone (DECADRON) injection 10 mg    ondansetron (ZOFRAN) injection 4 mg    ketorolac (TORADOL) injection 30 mg       RECENT VITALS:   BP: (!) 114/96  Heart Rate: (!) 103  Resp: 21  Temp: 97.2 °F (36.2 °C) SpO2: 98 %    (Please note that portions of this note were completed with a voice recognition program.  Efforts were made to edit the dictations but occasionally words are mis-transcribed.)    MD Brittney Clements  Attending Emergency Medicine Physician       Khris Cordero MD  10/19/22 [id number]

## 2022-10-19 NOTE — ED NOTES
Pt to ER for c/o headache x 2 days. Pt states she has Chiari Malformation and has these types of headaches all the time, but this is the worst one she has had. Pt states they have attempted to perform nerve blocks and have done an RFA on her to attempt to relieve the pressure behind her eye but thinks that they have worn off. Pt states that they have tried all types of things to relieve the pain but hasn't had a whole lot. Pt states the pain is behind her eye and feels like intense pressure. Pt has hx of POTS. Pt placed on full monitor, pt noted to be tachycardic. No acute distress noted, RR even and NL. Dr. Karoline Lake at bedside to evaluate pt.           Sarika Nair, DANIA  10/19/22 1615

## 2022-10-19 NOTE — CONSULTS
[id number] [facility] Neurology   IN-PATIENT SERVICE      NEUROLOGY CONSULT  NOTE            Date:   10/19/2022  Patient name:  Nasima Gamboa  Date of admission:  10/19/2022  YOB: 2000      Chief Complaint:     Chief Complaint   Patient presents with    Headache     X2 days     Reason for Consult:      Intractable headache    History of Present Illness: The patient is a 20 y.o. female with hx of chronic migraines, chiari 1 malformation, POTS who presents to the ED with complaint of headache. Reports it to be a bad headache however not sudden onset and reports onset of this episode last night gradually. Patient reports the headache to be on the posterior part of her head left of midline. Scaled at 10 out of 10 at worse and had some relief post initial headache cocktail down to 7 out of 10. Intense, pressure-like. +photophobia, +phonophobia  She reports that she tried Ibuprofen 800mg x 2-3 doses without relief and decided to come in for further evaluation. Did have some nausea yesterday but has resolved. No other associated focal neurologic deficits. Of note, [facility] and has tried occipital nerve blocks, MBB, RFA with some short-lived relief. She was evaluated by [facility] for her Chiarri. Has not re-established with neurology in town yet. Last regimen she tried without significant relief was: Topamax 50mg bid  Emgality  Fioricet prn  Magnesium Oxide 400mg    In the ED,  She has received  1L NS bolus  Decadron 10mg  Toradol 30mg  Mag 2g  Zofran 4mg    Patient declines compazine because it makes her feel restless and also declines benadryl as it is highly sedating to her. Patient also declined reglan in ER.     Past Medical History:     Past Medical History:   Diagnosis Date    Anxiety     Bulimia nervosa     BV (bacterial vaginosis) 11/28/2018    Chlamydia     Depression     Diarrhea     FREQUENT    Gonorrhea     Migraine     Neurocardiogenic syncope DR MARTIN    Obesity 1/30/2012    Panic attacks     PFO (patent foramen ovale) 1/30/2012    PFO (patent foramen ovale) 1/30/2012    PTSD (post-traumatic stress disorder)     Schizoaffective disorder, bipolar type ([facility] [address]     Sensory integration disorder     Trichimoniasis 11/28/2018        Past Surgical History:     Past Surgical History:   Procedure Laterality Date    APPENDECTOMY      COLONOSCOPY  8/13/2015    ENDOSCOPY, COLON, DIAGNOSTIC      INTRAUTERINE DEVICE INSERTION  03/15/16    Radha placement    SLEEVE GASTRECTOMY  2018    TONSILLECTOMY AND ADENOIDECTOMY      WISDOM TOOTH EXTRACTION Bilateral 11/113/15    x4        Medications Prior to Admission:     Prior to Admission medications    Medication Sig Start Date End Date Taking?  Authorizing Provider   fludrocortisone (FLORINEF) 0.1 MG tablet Take 1 tablet by mouth    Historical Provider, MD   guaiFENesin (MUCINEX) 600 MG extended release tablet Take 1 tablet by mouth 2 times daily 1/2/20   ERICK Harrington CNP   topiramate (TOPAMAX) 50 MG tablet Take 1 tablet by mouth 2 times daily 12/10/19   ERICK Pina CNP   lurasidone (LATUDA) 40 MG TABS tablet Take by mouth daily    Historical Provider, MD   Galcanezumab-gnlm [facility]) 120 MG/ML SOAJ Inject 120 mg into the skin every 30 days 11/14/19   ERICK Pina CNP   butalbital-acetaminophen-caffeine (FIORICET, ESGIC) -23 MG per tablet Take 1 tablet by mouth every 12 hours as needed for Headaches 11/13/19   ERICK Pina CNP   ondansetron (ZOFRAN-ODT) 4 MG disintegrating tablet Take 1 tablet by mouth 3 times daily as needed for Nausea or Vomiting 11/7/19   ERICK Pina CNP   Magnesium 400 MG TABS Take 1 tablet by mouth every evening 11/7/19   ERICK Pina CNP   cyanocobalamin 1000 MCG/ML injection Inject 1,000 mcg into the muscle once    Historical Provider, MD   fluticasone (FLONASE) 50 MCG/ACT nasal spray 2 sprays by Nasal route daily 5/16/19 ERICK aZragoza CNP   albuterol sulfate HFA (VENTOLIN HFA) 108 (90 Base) MCG/ACT inhaler Inhale 2 puffs into the lungs every 4 hours as needed for Wheezing or Shortness of Breath 10/4/18   Klausmaria antonia DiazERICK CNP        Allergies:     Betadine [povidone iodine]    Social History:     Tobacco:    reports that she has been smoking cigarettes. She has been exposed to tobacco smoke. She has never used smokeless tobacco.  Alcohol:      reports current alcohol use. Drug Use:  reports no history of drug use.     Family History:     Family History   Problem Relation Age of Onset    Diabetes Mother     Other Mother         memory problems    Cancer Maternal Aunt         bone    Brain Cancer Maternal Aunt     Cancer Maternal Grandmother         lung    No Known Problems Father     No Known Problems Maternal Grandfather     No Known Problems Paternal Grandmother     No Known Problems Paternal Grandfather     Other Other         Gallstones, Pancreatitis       Review of Systems:     As per HPI    Constitutional Negative for fever and chills   HEENT Negative for ear discharge, ear pain, nosebleed   Eyes Negative for photophobia, pain and discharge   Respiratory Negative for hemoptysis and sputum   Cardiovascular Negative for orthopnea, claudication and PND   Gastrointestinal Negative for abdominal pain, diarrhea, blood in stool   Musculoskeletal Negative for joint pain, negative for myalgia   Skin Negative for rash or itching   hematology Negative for ecchymosis, anemia   Psychiatric Negative for suicidal ideation, anxiety, depression, hallucinations       Physical Exam:   BP (!) 114/96   Pulse (!) 103   Temp 97.2 °F (36.2 °C) (Oral)   Resp 21   SpO2 98%   Temp (24hrs), Av.2 °F (36.2 °C), Min:97.2 °F (36.2 °C), Max:97.2 °F (36.2 °C)        General examination:      General Appearance:  alert, well appearing, and in no acute distress  HEENT: Normocephalic, atraumatic, moist mucus membranes  Neck: supple, no carotid bruits, (-) nuchal rigidity  Lungs:  Respirations unlabored, chest wall no deformity, BS normal  Cardiovascular: normal rate, regular rhythm  Abdomen: Soft, nontender, nondistended, normal bowel sounds  Skin: No gross lesions, rashes, bruising or bleeding on exposed skin area  Extremities:  peripheral pulses palpable, no cyanosis, clubbing or edema  Psych: normal affect      Neurological examination:      Mental status   Alert and oriented x 3; following all commands;   speech is fluent, no dysarthria, aphasia. Cranial nerves   II - visual fields intact to confrontation; pupils reactive  III, IV, VI - extraocular muscles intact; no LUCINDA; no nystagmus; no ptosis   V - normal facial sensation                                                               VII - normal facial symmetry                                                             VIII - intact hearing                                                                             IX, X - symmetrical palate elevation                                               XI - symmetrical shoulder shrug                                                       XII - midline tongue without atrophy or fasciculation     Motor function  Strength:   5/5 RUE, 5/5 RLE  5/5 LUE, 5/5  LLE  Normal bulk and tone. Sensory function Intact to touch, pin, vibration, proprioception throughout     Cerebellar Intact finger-nose-finger testing. Intact heel-shin testing. No dysdiadochokinesia present. No tremors                        Reflex function 2/4 symmetric throughout . Downgoing plantar response bilaterally. (-)Godinez's sign bilaterally      Gait                  Not tested due to condition           Diagnostics:      Laboratory Testing:  CBC: No results for input(s): WBC, HGB, PLT in the last 72 hours. BMP:  No results for input(s): NA, K, CL, CO2, BUN, CREATININE, GLUCOSE in the last 72 hours.       Lab Results   Component Value Date    CHOL 145 01/14/2017 LDLCHOLESTEROL 95 01/14/2017    HDL 21 (L) 01/14/2017    TRIG 145 01/14/2017    ALT 16 03/11/2017    AST 18 03/11/2017    TSH 2.56 10/17/2019    [id number] 309 06/27/2017       No results found for: PHENYTOIN, PHENYTOIN, VALPROATE, CBMZ      Imaging/Diagnostics:  CT HEAD WO CONTRAST    Result Date: 10/19/2022  EXAMINATION: CT OF THE HEAD WITHOUT CONTRAST  10/19/2022 3:12 pm TECHNIQUE: CT of the head was performed without the administration of intravenous contrast. Automated exposure control, iterative reconstruction, and/or weight based adjustment of the mA/kV was utilized to reduce the radiation dose to as low as reasonably achievable. COMPARISON: None. HISTORY: ORDERING SYSTEM PROVIDED HISTORY: severe headache, known Chiari malformation TECHNOLOGIST PROVIDED HISTORY: severe headache, known Chiari malformation Decision Support Exception - unselect if not a suspected or confirmed emergency medical condition->Emergency Medical Condition (MA) Is the patient pregnant?->No Reason for Exam: severe headache known Chiari malformation FINDINGS: BRAIN/VENTRICLES: No evidence of acute intracranial hemorrhage, mass effect or midline shift. No abnormal extra-axial fluid collection. The gray-white differentiation is maintained without evidence of an acute infarct. There is no evidence of hydrocephalus. The cerebellar tonsil position appears within normal limits. ORBITS: The visualized portion of the orbits demonstrate no acute abnormality. SINUSES: There is polypoid mucosal disease of the maxillary sinuses the mastoid air cells appear well aerated. SOFT TISSUES/SKULL:  No acute abnormality of the visualized skull or soft tissues. No acute intracranial abnormality seen. Impression:      20 y.o. female with hx of chronic migraines, chiari 1 malformation, POTS who presents to the ED with complaint of headache.  Reports it to be a bad headache however not sudden onset and reports onset of this episode last night gradually.     Intractable headache    Plan:     IV antiemetic followed by IV DHE 0.5mg x 3 doses  IV toradol 30mg q6hr PRN pain/headache  Verapamil 120qhs for headache prevention    MRI Brain w/o contrast    Case staffed with attending Dr. Hitesh Peters    Electronically signed by Rosario Rondon MD on 10/19/2022 at 4:59 PM      Electronically signed by   Rosario Rondon MD  Neurology PGY-4  10/19/2022  4:59 PM

## 2022-10-19 NOTE — ED PROVIDER NOTES
[address] [facility]                                Emergency Department                                               Faculty Attestation     I performed a history and physical examination of the patient and discussed management with the resident. I reviewed the residents note and agree with the documented findings and plan of care. Any areas of disagreement are noted on the chart. I was personally present for the key portions of any procedures. I have documented in the chart those procedures where I was not present during the key portions. I have reviewed the emergency nurses triage note. I agree with the chief complaint, past medical history, past surgical history, allergies, medications, social and family history as documented unless otherwise noted below. For Physician Assistant/ Nurse Practitioner cases/documentation I have personally evaluated this patient and have completed at least one if not all key elements of the E/M (history, physical exam, and MDM). Additional findings are as noted. Vitals:    10/19/22 1344   BP: (!) 131/90   Pulse: (!) 139   Resp: 18   Temp: 97.2 °F (36.2 °C)   TempSrc: Oral   SpO2: 80     21-year-old female has a past medical history of Chiari malformation and was routinely following with a neurologist in [place], very recently moved presents emergency department with \"worse headache of her life \". Her initial vitals in triage were tachycardic and hypertensive. However on repeat vitals her blood pressure is improved, still mildly tachycardic. Likely related to her pain. She has no focal deficits on exam.  States that she cannot tolerate Compazine or Benadryl. Plan for CT head. We will start with IV fluids, avoid initial NSAIDs until gross CT is available for review. Otherwise we will attempt to control symptoms.      Critical Care: NONE    Sharmila Ordaz,   Emergency Medicine Physician        Early Peers, DO  10/19/22 142

## 2022-10-19 NOTE — ED PROVIDER NOTES
[address] [facility]  ED  Emergency Department Encounter  Emergency Medicine Resident     Pt Name: Bessie Anna  MRN: [id number]  Cubag[place]phyllis 2000  Date of evaluation: 10/19/22  PCP:  ERICK Malave CNP    CHIEF COMPLAINT       Chief Complaint   Patient presents with    Headache     X2 days       HISTORY OFPRESENT ILLNESS  (Location/Symptom, Timing/Onset, Context/Setting, Quality, Duration, Modifying Factors,Severity.)      Bessie Anna is a 20 y. o.yo female who presents with a posterior left-sided headache. Patient states she gets chronic headaches that she has a Chiari I malformation. States however this headache feels different. States it is tied for the worst headache of her life. Does have associated blurred vision which happens occasionally with her headaches. States she took ibuprofen as well as \"my headache medicine\" at home with no relief. She states this typically helps her headaches that she is concerned as it did not this time. States the headache began yesterday, has gradually gotten worse. Not a sudden onset or thunderclap headache. Denies any numbness or tingling anywhere in her body. Is having associated photophobia. Was previously following with neurology in [place] however is not currently following wih them. Had some nausea yesterday however this has since resolved. No vomiting. Denies any fevers. PAST MEDICAL / SURGICAL / SOCIAL / FAMILY HISTORY      has a past medical history of Anxiety, Bulimia nervosa, BV (bacterial vaginosis), Chlamydia, Depression, Diarrhea, Gonorrhea, Migraine, Neurocardiogenic syncope, Obesity, Panic attacks, PFO (patent foramen ovale), PFO (patent foramen ovale), PTSD (post-traumatic stress disorder), Schizoaffective disorder, bipolar type ([facility] [address] Sensory integration disorder, and Trichimoniasis. has a past surgical history that includes Appendectomy; Colonoscopy (8/13/2015); Endoscopy, colon, diagnostic;  Tonsillectomy and adenoidectomy; [place] tooth extraction (Bilateral, 11/113/15); intrauterine device insertion (03/15/16); and Sleeve Gastrectomy (2018). Social History     Socioeconomic History    Marital status: Single     Spouse name: Not on file    Number of children: Not on file    Years of education: Not on file    Highest education level: Not on file   Occupational History    Not on file   Tobacco Use    Smoking status: Some Days     Types: Cigarettes     Passive exposure: Yes    Smokeless tobacco: Never   Substance and Sexual Activity    Alcohol use: Yes     Comment: socially    Drug use: No    Sexual activity: Yes     Birth control/protection: I.U.D. Other Topics Concern    Not on file   Social History Narrative    Not on file     Social Determinants of Health     Financial Resource Strain: Not on file   Food Insecurity: Not on file   Transportation Needs: Not on file   Physical Activity: Not on file   Stress: Not on file   Social Connections: Not on file   Intimate Partner Violence: Not on file   Housing Stability: Not on file       Family History   Problem Relation Age of Onset    Diabetes Mother     Other Mother         memory problems    Cancer Maternal Aunt         bone    Brain Cancer Maternal Aunt     Cancer Maternal Grandmother         lung    No Known Problems Father     No Known Problems Maternal Grandfather     No Known Problems Paternal Grandmother     No Known Problems Paternal Grandfather     Other Other         Gallstones, Pancreatitis        Allergies:  Betadine [povidone iodine]    Home Medications:  Prior to Admission medications    Medication Sig Start Date End Date Taking?  Authorizing Provider   fludrocortisone (FLORINEF) 0.1 MG tablet Take 1 tablet by mouth    Historical Provider, MD   guaiFENesin (MUCINEX) 600 MG extended release tablet Take 1 tablet by mouth 2 times daily 1/2/20   ERICK Muniz CNP   topiramate (TOPAMAX) 50 MG tablet Take 1 tablet by mouth 2 times daily 12/10/19   Marolyn Salts ERICK Courtney CNP   lurasidone (LATUDA) 40 MG TABS tablet Take by mouth daily    Historical Provider, MD   [facility]) 120 MG/ML SOAJ Inject 120 mg into the skin every 30 days 11/14/19   ERICK Perez CNP   butalbital-acetaminophen-caffeine (FIORICET, ESGIC) -20 MG per tablet Take 1 tablet by mouth every 12 hours as needed for Headaches 11/13/19   REICK Perez CNP   ondansetron (ZOFRAN-ODT) 4 MG disintegrating tablet Take 1 tablet by mouth 3 times daily as needed for Nausea or Vomiting 11/7/19   ERICK Perez CNP   Magnesium 400 MG TABS Take 1 tablet by mouth every evening 11/7/19   ERICK Perez CNP   cyanocobalamin 1000 MCG/ML injection Inject 1,000 mcg into the muscle once    Historical Provider, MD   fluticasone [facility]) 50 MCG/ACT nasal spray 2 sprays by Nasal route daily 5/16/19   ERICK Perez CNP   albuterol sulfate HFA (VENTOLIN HFA) 108 (90 Base) MCG/ACT inhaler Inhale 2 puffs into the lungs every 4 hours as needed for Wheezing or Shortness of Breath 10/4/18   ERICK Perez CNP       REVIEW OFSYSTEMS    (2-9 systems for level 4, 10 or more for level 5)      Review of Systems   Constitutional:  Negative for chills and fever. HENT:  Negative for congestion. Eyes:  Positive for photophobia. Respiratory:  Negative for shortness of breath. Cardiovascular:  Negative for chest pain. Gastrointestinal:  Positive for nausea. Negative for abdominal pain and vomiting. Genitourinary:  Negative for dysuria. Musculoskeletal:  Negative for back pain. Skin:  Negative for rash. Neurological:  Positive for headaches. Negative for weakness and numbness. Psychiatric/Behavioral:  Negative for confusion.       PHYSICAL EXAM   (up to 7 for level 4, 8 or more forlevel 5)      INITIAL VITALS:   Vitals:    10/19/22 1449 10/19/22 1541 10/19/22 1601 10/19/22 1717   BP: 114/83 120/88 (!) 114/96 (!) 129/93   Pulse: (!) 109 (!) 101 (!) 103 (!) 121   Resp: 18 16 21 25   Temp:       TempSrc:       SpO2: 97% 95% 98% 98%         Physical Exam  Vitals reviewed. Constitutional:       General: She is not in acute distress. Appearance: Normal appearance. She is not ill-appearing. HENT:      Head: Normocephalic. Right Ear: External ear normal.      Left Ear: External ear normal.      Nose: Nose normal.      Mouth/Throat:      Mouth: Mucous membranes are moist.   Eyes:      General:         Right eye: No discharge. Left eye: No discharge. Extraocular Movements: Extraocular movements intact. Pupils: Pupils are equal, round, and reactive to light. Cardiovascular:      Rate and Rhythm: Regular rhythm. Tachycardia present. Pulmonary:      Effort: Pulmonary effort is normal. No respiratory distress. Abdominal:      General: There is no distension. Palpations: Abdomen is soft. Tenderness: There is no abdominal tenderness. Musculoskeletal:      Cervical back: Normal range of motion. Comments: Moving all 4 extremities   Skin:     General: Skin is warm. Capillary Refill: Capillary refill takes less than 2 seconds. Neurological:      General: No focal deficit present. Mental Status: She is alert and oriented to person, place, and time. Cranial Nerves: No cranial nerve deficit. Comments: Strength 5/5 in bilateral upper and lower extremities. Sensation intact.    Psychiatric:         Mood and Affect: Mood normal.       DIFFERENTIAL  DIAGNOSIS     PLAN (LABS / IMAGING / EKG):  Orders Placed This Encounter   Procedures    COVID-19, Rapid    CT HEAD WO CONTRAST    MRI BRAIN WO CONTRAST    Telemetry monitoring - 72 hour duration    Inpatient consult to Neurology    Place in Observation Service       MEDICATIONS ORDERED:  Orders Placed This Encounter   Medications    DISCONTD: metoclopramide (REGLAN) injection 10 mg    0.9 % sodium chloride bolus    magnesium sulfate 2000 mg in 50 mL IVPB premix    dexamethasone (DECADRON) injection 10 mg    ondansetron (ZOFRAN) injection 4 mg    ketorolac (TORADOL) injection 30 mg    FOLLOWED BY Linked Order Group     ondansetron (ZOFRAN) injection 4 mg     dihydroergotamine (DHE) injection 0.5 mg    ketorolac (TORADOL) injection 30 mg    verapamil (CALAN SR) extended release tablet 120 mg       DDX: Migraine headache, increased intracranial pressure, Chiari malformation, head bleed    Initial MDM/Plan: 20 y.o. female who presents with severe headache. Patient has chronic headaches due to a Chiari I malformation however states this feels different. Tired for the worst headache of her life. Tried her at home headache medication with no relief. Patient appears well on initial evaluation, no acute distress, tachycardic but otherwise stable vital signs, afebrile. Normal neurologic exam.  As patient is status headache feels different and is tied to the worst headache of her life will obtain head CT. Patient has many stipulations regarding which medicine she can receive. Offered her Compazine and Benadryl. Patient states she cannot take Compazine as she has an ache ascitic reaction to it and she cannot take Benadryl with it as Benadryl makes her very sleepy. Holding off on Toradol until the head CT is complete at this time. We will try Reglan, fluids, magnesium, Decadron. Will reassess. DIAGNOSTIC RESULTS / EMERGENCYDEPARTMENT COURSE / MDM     LABS:  Labs Reviewed   COVID-19, RAPID         RADIOLOGY:  CT HEAD WO CONTRAST    Result Date: 10/19/2022  EXAMINATION: CT OF THE HEAD WITHOUT CONTRAST  10/19/2022 3:12 pm TECHNIQUE: CT of the head was performed without the administration of intravenous contrast. Automated exposure control, iterative reconstruction, and/or weight based adjustment of the mA/kV was utilized to reduce the radiation dose to as low as reasonably achievable. COMPARISON: None.  HISTORY: ORDERING SYSTEM PROVIDED HISTORY: severe headache, known Chiari malformation TECHNOLOGIST PROVIDED HISTORY: severe headache, known Chiari malformation Decision Support Exception - unselect if not a suspected or confirmed emergency medical condition->Emergency Medical Condition (MA) Is the patient pregnant?->No Reason for Exam: severe headache known Chiari malformation FINDINGS: BRAIN/VENTRICLES: No evidence of acute intracranial hemorrhage, mass effect or midline shift. No abnormal extra-axial fluid collection. The gray-white differentiation is maintained without evidence of an acute infarct. There is no evidence of hydrocephalus. The cerebellar tonsil position appears within normal limits. ORBITS: The visualized portion of the orbits demonstrate no acute abnormality. SINUSES: There is polypoid mucosal disease of the maxillary sinuses the mastoid air cells appear well aerated. SOFT TISSUES/SKULL:  No acute abnormality of the visualized skull or soft tissues. No acute intracranial abnormality seen. MRI BRAIN WO CONTRAST    Result Date: 10/19/2022  EXAMINATION: MRI OF THE BRAIN WITHOUT CONTRAST  10/19/2022 6:25 pm TECHNIQUE: Multiplanar multisequence MRI of the brain was performed without the administration of intravenous contrast. COMPARISON: Same-day CT head HISTORY: ORDERING SYSTEM PROVIDED HISTORY: intractable headache, hx of chiari 1 TECHNOLOGIST PROVIDED HISTORY: intractable headache, hx of chiari 1 Reason for Exam: intractable headache, hx of chiari 1 FINDINGS: INTRACRANIAL STRUCTURES/VENTRICLES: There is no acute infarct. No mass effect or midline shift. No evidence of an acute intracranial hemorrhage. The ventricles and sulci are normal in size and configuration. The sellar/suprasellar regions appear unremarkable. The normal signal voids within the major intracranial vessels appear maintained. There are borderline low lying cerebellar tonsils. ORBITS: The visualized portion of the orbits demonstrate no acute abnormality.  SINUSES: Mucous retention cyst in both maxillary sinuses. BONES/SOFT TISSUES: The bone marrow signal intensity appears normal. The soft tissues demonstrate no acute abnormality. 1. No acute intracranial abnormality. 2. Borderline low lying cerebellar tonsils, which do not meet criteria for Chiari I malformation. EKG  None    All EKG's are interpreted by the Emergency Department Physicianwho either signs or Co-signs this chart in the absence of a cardiologist.    EMERGENCY DEPARTMENT COURSE:  ED Course as of 10/19/22 2026   Wed Oct 19, 2022   1428 Patient declining Reglan. Stating she only can take Zofran. Explained [place] does not help with headache, patient states she understands but still declines Reglan. Will dose with Zofran instead. [AB]   1550 CT HEAD WO CONTRAST  IMPRESSION:  No acute intracranial abnormality seen. [AB]   1613 Reevaluated patient. Has had no relief from any medications at this time. Will consult neurology for further recommendations. [AB]   1093 Neurology at bedside evaluating patient [AB]   [phone] to neurology. Would like patient admitted to observation for IV steroids as well as further headache treatment including DHE. Admission orders placed. Patient updated on plan of care. [AB]      ED Course User Index  [AB] Wing Vasquez DO          PROCEDURES:  None    CONSULTS:  IP CONSULT TO NEUROLOGY    CRITICAL CARE:  See attending physician note    FINAL IMPRESSION      1. Acute intractable headache, unspecified headache type          DISPOSITION / PLAN     DISPOSITION Admitted 10/19/2022 05:15:35 PM      PATIENT REFERRED TO:  No follow-up provider specified.     DISCHARGE MEDICATIONS:  New Prescriptions    No medications on file       Alfred Mcleod DO  Emergency Medicine Resident    (Please note that portions of this note were completed with a voice recognition program.Efforts were made to edit the dictations but occasionally words are mis-transcribed.)        Wing Vasquez DO  Resident  10/19/22 2026

## 2022-10-20 PROBLEM — Q07.00 ARNOLD-CHIARI MALFORMATION (HCC): Status: ACTIVE | Noted: 2022-10-20

## 2022-10-20 PROBLEM — G43.719 INTRACTABLE CHRONIC MIGRAINE WITHOUT AURA AND WITHOUT STATUS MIGRAINOSUS: Status: ACTIVE | Noted: 2022-10-20

## 2022-10-20 PROBLEM — G90.A POTS (POSTURAL ORTHOSTATIC TACHYCARDIA SYNDROME): Status: ACTIVE | Noted: 2022-10-20

## 2022-10-20 LAB
ABSOLUTE EOS #: <0.03 K/UL (ref 0–0.44)
ABSOLUTE IMMATURE GRANULOCYTE: 0.06 K/UL (ref 0–0.3)
ABSOLUTE LYMPH #: 1.76 K/UL (ref 1.1–3.7)
ABSOLUTE MONO #: 0.89 K/UL (ref 0.1–1.2)
ANION GAP SERPL CALCULATED.3IONS-SCNC: 10 MMOL/L (ref 9–17)
BASOPHILS # BLD: 0 % (ref 0–2)
BASOPHILS ABSOLUTE: <0.03 K/UL (ref 0–0.2)
BUN BLDV-MCNC: 11 MG/DL (ref 6–20)
CALCIUM SERPL-MCNC: 8 MG/DL (ref 8.6–10.4)
CHLORIDE BLD-SCNC: 108 MMOL/L (ref 98–107)
CO2: 18 MMOL/L (ref 20–31)
CREAT SERPL-MCNC: 0.49 MG/DL (ref 0.5–0.9)
EOSINOPHILS RELATIVE PERCENT: 0 % (ref 1–4)
FOLATE: 4.5 NG/ML
GFR SERPL CREATININE-BSD FRML MDRD: >60 ML/MIN/1.73M2
GLUCOSE BLD-MCNC: 157 MG/DL (ref 70–99)
HCT VFR BLD CALC: 32.7 % (ref 36.3–47.1)
HEMOGLOBIN: 11 G/DL (ref 11.9–15.1)
IMMATURE GRANULOCYTES: 1 %
LYMPHOCYTES # BLD: 16 % (ref 24–43)
MCH RBC QN AUTO: 25.5 PG (ref 25.2–33.5)
MCHC RBC AUTO-ENTMCNC: 33.6 G/DL (ref 28.4–34.8)
MCV RBC AUTO: 75.7 FL (ref 82.6–102.9)
MONOCYTES # BLD: 8 % (ref 3–12)
NRBC AUTOMATED: 0 PER 100 WBC
PDW BLD-RTO: 14.6 % (ref 11.8–14.4)
PLATELET # BLD: 489 K/UL (ref 138–453)
PMV BLD AUTO: 12.1 FL (ref 8.1–13.5)
POTASSIUM SERPL-SCNC: 4.2 MMOL/L (ref 3.7–5.3)
RBC # BLD: 4.32 M/UL (ref 3.95–5.11)
RBC # BLD: ABNORMAL 10*6/UL
SEG NEUTROPHILS: 75 % (ref 36–65)
SEGMENTED NEUTROPHILS ABSOLUTE COUNT: 8.56 K/UL (ref 1.5–8.1)
SODIUM BLD-SCNC: 136 MMOL/L (ref 135–144)
VITAMIN B-12: 450 PG/ML (ref 232–1245)
WBC # BLD: 11.3 K/UL (ref 3.5–11.3)

## 2022-10-20 PROCEDURE — 99204 OFFICE O/P NEW MOD 45 MIN: CPT | Performed by: PSYCHIATRY & NEUROLOGY

## 2022-10-20 PROCEDURE — 6370000000 HC RX 637 (ALT 250 FOR IP): Performed by: STUDENT IN AN ORGANIZED HEALTH CARE EDUCATION/TRAINING PROGRAM

## 2022-10-20 PROCEDURE — 82746 ASSAY OF FOLIC ACID SERUM: CPT

## 2022-10-20 PROCEDURE — 80048 BASIC METABOLIC PNL TOTAL CA: CPT

## 2022-10-20 PROCEDURE — 6360000002 HC RX W HCPCS

## 2022-10-20 PROCEDURE — 6360000002 HC RX W HCPCS: Performed by: EMERGENCY MEDICINE

## 2022-10-20 PROCEDURE — 6370000000 HC RX 637 (ALT 250 FOR IP): Performed by: FAMILY MEDICINE

## 2022-10-20 PROCEDURE — 36415 COLL VENOUS BLD VENIPUNCTURE: CPT

## 2022-10-20 PROCEDURE — 6360000002 HC RX W HCPCS: Performed by: FAMILY MEDICINE

## 2022-10-20 PROCEDURE — 6360000002 HC RX W HCPCS: Performed by: STUDENT IN AN ORGANIZED HEALTH CARE EDUCATION/TRAINING PROGRAM

## 2022-10-20 PROCEDURE — 82607 VITAMIN B-12: CPT

## 2022-10-20 PROCEDURE — 2580000003 HC RX 258: Performed by: STUDENT IN AN ORGANIZED HEALTH CARE EDUCATION/TRAINING PROGRAM

## 2022-10-20 PROCEDURE — 85025 COMPLETE CBC W/AUTO DIFF WBC: CPT

## 2022-10-20 PROCEDURE — 84425 ASSAY OF VITAMIN B-1: CPT

## 2022-10-20 PROCEDURE — G0378 HOSPITAL OBSERVATION PER HR: HCPCS

## 2022-10-20 PROCEDURE — 96376 TX/PRO/DX INJ SAME DRUG ADON: CPT

## 2022-10-20 PROCEDURE — 87340 HEPATITIS B SURFACE AG IA: CPT

## 2022-10-20 PROCEDURE — 6370000000 HC RX 637 (ALT 250 FOR IP): Performed by: EMERGENCY MEDICINE

## 2022-10-20 PROCEDURE — 96375 TX/PRO/DX INJ NEW DRUG ADDON: CPT

## 2022-10-20 PROCEDURE — 86803 HEPATITIS C AB TEST: CPT

## 2022-10-20 PROCEDURE — 87389 HIV-1 AG W/HIV-1&-2 AB AG IA: CPT

## 2022-10-20 RX ORDER — MORPHINE SULFATE 2 MG/ML
2 INJECTION, SOLUTION INTRAMUSCULAR; INTRAVENOUS ONCE
Status: COMPLETED | OUTPATIENT
Start: 2022-10-20 | End: 2022-10-20

## 2022-10-20 RX ORDER — FLUDROCORTISONE ACETATE 0.1 MG/1
0.1 TABLET ORAL DAILY
Status: DISCONTINUED | OUTPATIENT
Start: 2022-10-20 | End: 2022-10-21 | Stop reason: HOSPADM

## 2022-10-20 RX ORDER — ONDANSETRON 2 MG/ML
4 INJECTION INTRAMUSCULAR; INTRAVENOUS EVERY 8 HOURS
Status: DISCONTINUED | OUTPATIENT
Start: 2022-10-20 | End: 2022-10-20

## 2022-10-20 RX ORDER — DIHYDROERGOTAMINE MESYLATE 1 MG/ML
0.5 INJECTION, SOLUTION INTRAMUSCULAR; INTRAVENOUS; SUBCUTANEOUS EVERY 8 HOURS
Status: DISCONTINUED | OUTPATIENT
Start: 2022-10-21 | End: 2022-10-20 | Stop reason: SDUPTHER

## 2022-10-20 RX ORDER — DIHYDROERGOTAMINE MESYLATE 1 MG/ML
0.5 INJECTION, SOLUTION INTRAMUSCULAR; INTRAVENOUS; SUBCUTANEOUS EVERY 8 HOURS
Status: DISCONTINUED | OUTPATIENT
Start: 2022-10-20 | End: 2022-10-20

## 2022-10-20 RX ORDER — ONDANSETRON 2 MG/ML
4 INJECTION INTRAMUSCULAR; INTRAVENOUS EVERY 8 HOURS
Status: DISCONTINUED | OUTPATIENT
Start: 2022-10-20 | End: 2022-10-20 | Stop reason: SDUPTHER

## 2022-10-20 RX ORDER — METHYLPREDNISOLONE SODIUM SUCCINATE 125 MG/2ML
125 INJECTION, POWDER, LYOPHILIZED, FOR SOLUTION INTRAMUSCULAR; INTRAVENOUS EVERY 8 HOURS
Status: COMPLETED | OUTPATIENT
Start: 2022-10-20 | End: 2022-10-21

## 2022-10-20 RX ORDER — ONDANSETRON 2 MG/ML
4 INJECTION INTRAMUSCULAR; INTRAVENOUS EVERY 8 HOURS
Status: COMPLETED | OUTPATIENT
Start: 2022-10-20 | End: 2022-10-21

## 2022-10-20 RX ORDER — DIHYDROERGOTAMINE MESYLATE 1 MG/ML
0.5 INJECTION, SOLUTION INTRAMUSCULAR; INTRAVENOUS; SUBCUTANEOUS EVERY 8 HOURS
Status: COMPLETED | OUTPATIENT
Start: 2022-10-20 | End: 2022-10-21

## 2022-10-20 RX ORDER — MORPHINE SULFATE 4 MG/ML
4 INJECTION, SOLUTION INTRAMUSCULAR; INTRAVENOUS EVERY 4 HOURS PRN
Status: DISCONTINUED | OUTPATIENT
Start: 2022-10-20 | End: 2022-10-21 | Stop reason: HOSPADM

## 2022-10-20 RX ADMIN — SODIUM CHLORIDE 25 ML: 9 INJECTION, SOLUTION INTRAVENOUS at 01:13

## 2022-10-20 RX ADMIN — ONDANSETRON 4 MG: 2 INJECTION INTRAMUSCULAR; INTRAVENOUS at 08:41

## 2022-10-20 RX ADMIN — MORPHINE SULFATE 2 MG: 2 INJECTION, SOLUTION INTRAMUSCULAR; INTRAVENOUS at 02:19

## 2022-10-20 RX ADMIN — DIHYDROERGOTAMINE MESYLATE 0.5 MG: 1 INJECTION INTRAMUSCULAR; INTRAVENOUS; SUBCUTANEOUS at 08:41

## 2022-10-20 RX ADMIN — SODIUM CHLORIDE, PRESERVATIVE FREE 10 ML: 5 INJECTION INTRAVENOUS at 20:37

## 2022-10-20 RX ADMIN — KETOROLAC TROMETHAMINE 30 MG: 30 INJECTION, SOLUTION INTRAMUSCULAR at 08:41

## 2022-10-20 RX ADMIN — HYDROMORPHONE HYDROCHLORIDE 0.5 MG: 1 INJECTION, SOLUTION INTRAMUSCULAR; INTRAVENOUS; SUBCUTANEOUS at 16:48

## 2022-10-20 RX ADMIN — SODIUM CHLORIDE: 9 INJECTION, SOLUTION INTRAVENOUS at 14:44

## 2022-10-20 RX ADMIN — METHYLPREDNISOLONE SODIUM SUCCINATE 125 MG: 125 INJECTION, POWDER, FOR SOLUTION INTRAMUSCULAR; INTRAVENOUS at 12:11

## 2022-10-20 RX ADMIN — ACETAMINOPHEN 650 MG: 325 TABLET ORAL at 16:48

## 2022-10-20 RX ADMIN — ONDANSETRON 4 MG: 2 INJECTION INTRAMUSCULAR; INTRAVENOUS at 01:10

## 2022-10-20 RX ADMIN — FLUDROCORTISONE ACETATE 0.1 MG: 0.1 TABLET ORAL at 16:48

## 2022-10-20 RX ADMIN — ACETAMINOPHEN 650 MG: 325 TABLET ORAL at 12:11

## 2022-10-20 RX ADMIN — METHYLPREDNISOLONE SODIUM SUCCINATE 125 MG: 125 INJECTION, POWDER, FOR SOLUTION INTRAMUSCULAR; INTRAVENOUS at 20:37

## 2022-10-20 RX ADMIN — ONDANSETRON 4 MG: 2 INJECTION INTRAMUSCULAR; INTRAVENOUS at 16:48

## 2022-10-20 RX ADMIN — KETOROLAC TROMETHAMINE 30 MG: 30 INJECTION, SOLUTION INTRAMUSCULAR at 01:11

## 2022-10-20 RX ADMIN — KETOROLAC TROMETHAMINE 30 MG: 30 INJECTION, SOLUTION INTRAMUSCULAR at 14:44

## 2022-10-20 RX ADMIN — VERAPAMIL HYDROCHLORIDE 120 MG: 120 TABLET, FILM COATED, EXTENDED RELEASE ORAL at 20:36

## 2022-10-20 RX ADMIN — DIHYDROERGOTAMINE MESYLATE 0.5 MG: 1 INJECTION INTRAMUSCULAR; INTRAVENOUS; SUBCUTANEOUS at 16:47

## 2022-10-20 ASSESSMENT — PAIN SCALES - GENERAL
PAINLEVEL_OUTOF10: 4
PAINLEVEL_OUTOF10: 5
PAINLEVEL_OUTOF10: 7
PAINLEVEL_OUTOF10: 8

## 2022-10-20 ASSESSMENT — PAIN DESCRIPTION - LOCATION
LOCATION: GENERALIZED
LOCATION: HEAD
LOCATION: HEAD

## 2022-10-20 NOTE — PROGRESS NOTES
NEUROLOGY INPATIENT PROGRESS NOTE    10/20/2022         Subjective:   Yasmin Lazo is a  20 y.o. female admitted on 10/19/2022 with Intractable headache, unspecified chronicity pattern, unspecified headache type [R51.9]  Acute intractable headache, unspecified headache type [R51.9]    Interval History:  Briefly, this is a  20 y.o. female with pertinent hx of migraines who was admitted on 10/19/2022 with severe 10/10 in intensity headache that started 10/18 with associated phothophobia and phonophobia and without any relief after Ibuprofen 800 mg x 2-3 doses at home. Was given 1L NS bolus, Decadron 10mg, Toradol 30 mg, Mag 2 g, and Zofran 4 mg in ED. Pt declined compazine, reglan, and benadryl in ED. Was previously on migraine medication regimen but dc'ed on own due to pt felt it was not helpful. Today, pt was seen and examined alongside attending and chart was reviewed. Pt is still c/o HA but admits to mild improvement in HA to an 8 out of 10 in intensity with DHE.     ROS:    Review of Systems   Neurological:  Positive for headaches. Negative for facial asymmetry and speech difficulty. Psychiatric/Behavioral:  Negative for confusion. History of Present Illness:    Pt has past medical history of multiple neurologic pathologies including seizure disorder, migraines, questionable Chiari malformation, and has had extensive workup in the past and been seen by multiple Neurologists in various locations. Regarding migraine history:  Pt previously on Maxalt, Topiramate, Emgality - she dc'ed on own bc felt not helpful. HA: 6-7/10 intensity w associated n, photophobia, phonophobia, dizziness, floaters, almost daily and 1x/week suffers more intense 10/10 HA. Takes ibuprofen as abortive. Seen in HA clinic July 2021; Riboflavin 400 mg prn, sumatriptan rx'ed but pt nvr got, compazine prn but pt does not like how compazine makes her feel.  Has gotten RF therapy in June at pain clinic for headaches and felt it greatly improved severity of headaches to a 1 out of 10 in intensity. General PMHx:   Bariatric surgery, bulimia nervosa, PFO, PTSD, neurocardiogenic syncope, migraines, seizure disorder, postural orthostatic tachycardia syndrome, migraine, Chiari 1 malformation (has seen NSG Dr Harris Bravo and Dr. Per Crane?), POTS (follows Dr. Ananmarie Eduardo at [facility]), functional neurologic disorder (referred to [facility]. Sherryle Anthony at [facility]), multiple vitamin deficiencies (vitamins B6, B1, D 25, B12, folate), schizoaffective disorder bipolar type. Recent OP visits:  Recently seen 8/3 by PCP for increased frequency of seizures including multiple recent breakthrough seizures including 5 on 7/30 lasting few seconds. Has previously followed with [address]; last seen on 1/11/2022 and plan was for EMU prior to starting AED. Neuro team there felt better to hold off starting AEDs due to pt having had multiple normal EEG as well MRIs negative for acute pathology. Reportedly has had extensive workup by numerous Neurology teams in both [place] and [place] \"without objective results other than Chiari 1 malformation and low thiamine level. \" QSART positive for POTS. Previous workup per [facility] Neurologist Note (Dr. Sandip Sommers):  EEG 12/9/2021 normal  EEG 48-hour around September 2021: Reportedly captured 6 paroxysmal movements lightheadedness without any EEG changes  MRI brain with side flow 12/22/2021: low-lying right cerebellar tonsil, and CSF flow study bidirectional/slightly asynchronous flow ventrally and dorsally. Magnum with fluid present posterior to cerebellar tonsils  QSART 10/21/21: evidence of mildly symptomatic orthostatic tachycardia meeting criteria for POTS  ENT/NCS 4/21/2021: normal    No current facility-administered medications on file prior to encounter.      Current Outpatient Medications on File Prior to Encounter   Medication Sig Dispense Refill    fludrocortisone (FLORINEF) 0.1 MG tablet Take 1 tablet by mouth guaiFENesin (MUCINEX) 600 MG extended release tablet Take 1 tablet by mouth 2 times daily (Patient not taking: Reported on 10/19/2022)      topiramate (TOPAMAX) 50 MG tablet Take 1 tablet by mouth 2 times daily (Patient not taking: Reported on 10/19/2022) 60 tablet 5    lurasidone (LATUDA) 40 MG TABS tablet Take by mouth daily      Galcanezumab-gnlm (EMGALITY) 120 MG/ML SOAJ Inject 120 mg into the skin every 30 days 1 mL 5    butalbital-acetaminophen-caffeine (FIORICET, ESGIC) -40 MG per tablet Take 1 tablet by mouth every 12 hours as needed for Headaches (Patient not taking: Reported on 10/19/2022) 60 tablet 0    ondansetron (ZOFRAN-ODT) 4 MG disintegrating tablet Take 1 tablet by mouth 3 times daily as needed for Nausea or Vomiting (Patient not taking: Reported on 10/19/2022) 21 tablet 0    Magnesium 400 MG TABS Take 1 tablet by mouth every evening (Patient not taking: Reported on 10/19/2022) 30 tablet 1    cyanocobalamin 1000 MCG/ML injection Inject 1,000 mcg into the muscle once (Patient not taking: Reported on 10/19/2022)      fluticasone (FLONASE) 50 MCG/ACT nasal spray 2 sprays by Nasal route daily (Patient not taking: Reported on 10/19/2022) 1 Bottle 3    albuterol sulfate HFA (VENTOLIN HFA) 108 (90 Base) MCG/ACT inhaler Inhale 2 puffs into the lungs every 4 hours as needed for Wheezing or Shortness of Breath (Patient not taking: Reported on 10/19/2022) 1 Inhaler 0       Past Medical History:   Diagnosis Date    Anxiety     Bulimia nervosa     BV (bacterial vaginosis) 11/28/2018    Chlamydia     Depression     Diarrhea     FREQUENT    Gonorrhea     Migraine     Neurocardiogenic syncope     DR MARTIN    Obesity 1/30/2012    Panic attacks     PFO (patent foramen ovale) 1/30/2012    PFO (patent foramen ovale) 1/30/2012    PTSD (post-traumatic stress disorder)     [facility]     Sensory integration disorder     Trichimoniasis 11/28/2018       Past Surgical History: Procedure Laterality Date    APPENDECTOMY      COLONOSCOPY  8/13/2015    ENDOSCOPY, COLON, DIAGNOSTIC      INTRAUTERINE DEVICE INSERTION  03/15/16    Radha placement    SLEEVE GASTRECTOMY  2018    TONSILLECTOMY AND ADENOIDECTOMY      WISDOM TOOTH EXTRACTION Bilateral 11/113/15    x4       Allergies: Lyla Wu is allergic to betadine [povidone iodine]. Objective:     Medications:     sodium chloride flush  5-40 mL IntraVENous 2 times per day    enoxaparin  40 mg SubCUTAneous Daily    ondansetron  4 mg IntraVENous Q8H    Followed by    dihydroergotamine  0.5 mg IntraVENous Q8H    verapamil  120 mg Oral Nightly     PRN Meds include: albuterol sulfate HFA, sodium chloride flush, sodium chloride, acetaminophen, ondansetron **OR** ondansetron, ketorolac    NEUROLOGICAL EXAMINATION  /86   Pulse (!) 102   Temp 97.5 °F (36.4 °C) (Oral)   Resp 14   Ht 5' 3.78\" (1.62 m)   Wt 154 lb 5.2 oz (70 kg)   SpO2 97%   BMI 26.67 kg/m²     Blood pressure range: Systolic (28IOH), VWM:081 , Min:114 , ECC:895   ; Diastolic (88YXW), DIV:27, Min:83, Max:97    Vitals:    10/19/22 2030 10/19/22 2247 10/19/22 2307 10/20/22 0249   BP: 128/88 122/86     Pulse: (!) 109 (!) 102     Resp: 14 16  14   Temp:  97.5 °F (36.4 °C)     TempSrc:  Oral     SpO2: 99% 97%     Weight:   154 lb 5.2 oz (70 kg)    Height:   5' 3.78\" (1.62 m)        Physical Exam  Constitutional:       General: She is not in acute distress. Appearance: She is not toxic-appearing. Eyes:      Extraocular Movements: Extraocular movements intact. Conjunctiva/sclera: Conjunctivae normal.   Cardiovascular:      Rate and Rhythm: Normal rate and regular rhythm. Pulmonary:      Effort: Pulmonary effort is normal. No respiratory distress. Musculoskeletal:         General: Normal range of motion.    Neurological:  MENTAL STATUS:  Alert, oriented, no confusion, normal speech, normal language, no hallucination or delusion   CRANIAL NERVES: II - XII grossly intact   MOTOR FUNCTION: Grossly 5/5 strength symmetrically in all extremities     Normal bulk, normal tone and no involuntary movements, no tremor   SENSORY FUNCTION:  Normal touch on all limbs   CEREBELLAR FUNCTION:  Intact fine motor control over upper limbs and lower limbs   REFLEX FUNCTION:  Symmetric in upper and lower extremities   STATION and GAIT  Not tested     Data:    Lab Results:   CBC:   Recent Labs     10/20/22  0419   WBC 11.3   HGB 11.0*   *     BMP:    Recent Labs     10/20/22  0419      K 4.2   *   CO2 18*   BUN 11   CREATININE 0.49*   GLUCOSE 157*         Lab Results   Component Value Date    CHOL 145 01/14/2017    LDLCHOLESTEROL 95 01/14/2017    HDL 21 (L) 01/14/2017    TRIG 145 01/14/2017    ALT 16 03/11/2017    AST 18 03/11/2017    TSH 2.56 10/17/2019    [id number] 309 06/27/2017       No results found for: PHENYTOIN, PHENYTOIN, VALPROATE, CBMZ    IMAGING  CT HEAD WO CONTRAST    Result Date: 10/19/2022  No acute intracranial abnormality seen. MRI BRAIN WO CONTRAST    Result Date: 10/19/2022  1. No acute intracranial abnormality. 2. Borderline low lying cerebellar tonsils, which do not meet criteria for Chiari I malformation. Assessment and Plan  Case of a  20 y.o. female with pertinent hx of migraines who was admitted on 10/19/2022 with severe 10 out of 10 in intensity headache that started 10/18 with associated phothophobia and phonophobia and without any relief after Ibuprofen 800 mg x 2-3 doses at home. Was given 1L NS bolus, Decadron 10mg, Pt typically has daily headaches which are tolerable and a more severe 10/10 headache about once weekly. Toradol 30 mg, Mag 2 g, and Zofran 4 mg given to in ED. Pt declined compazine, reglan, and benadryl in ED. Was previously on migraine medication regimen but dc'ed on own due to pt felt it was not helpful.        Impression is acute on chronic migraine   CT head negative  MRI brain wo contrast showing borderline low lying cerebellar tonsils not meeting criteria for Chiari I malformation - do not suspect HA is 2/2 to low lying tonsils at this time  DHE . 5 mg x 3 doses given with minimal relief  Toradol 30 q6hr prn  Verapamil 120 q hs for HA prevention started  Will give Solumedrol  mg Q8H x 3 doses total to break current INFANTE cycle    Gokul Singh DO  Neurology Resident PGY-2  10/20/2022  7:45 AM

## 2022-10-20 NOTE — CARE COORDINATION
10/20/22 1118   Service Assessment   Patient Orientation Alert and Oriented   Cognition Alert   History Provided By Patient   Primary Caregiver Self   Support Systems Spouse/Significant Other;Friends/Neighbors   PCP Verified by CM Yes   Last Visit to PCP Within last 3 months   Prior Functional Level Independent in ADLs/IADLs   Current Functional Level Independent in ADLs/IADLs   Can patient return to prior living arrangement Yes   Ability to make needs known: Good   Social/Functional History   Lives With Significant other  (back and forth staying with SO and friend)   Type of [address] Two level;Bed/Bath upstairs   Bathroom Shower/Tub Tub/Shower unit   [facility] Help From Friend(s)   ADL Assistance Independent   Homemaking Assistance Independent   Homemaking Responsibilities Yes   Ambulation Assistance Independent   Transfer Assistance Independent   Active  Yes   Mode of Transportation Car   Occupation Part time employment   Discharge [address] Prior To Admission None   Potential Assistance Needed N/A   DME Ordered? No   Patient expects to be discharged to: House   One/Two Story Residence Two story   History of falls?  1   Return home independently

## 2022-10-20 NOTE — PLAN OF CARE
Patient states pain is controlled at this time. Patient is excellent historian. Significant other at bedside.

## 2022-10-20 NOTE — H&P
[address] [place], [place] [address]U / [address] [place] [address]  NOTE     Pt Name: Dez Abdalla  MRN: [id number]  [place] 2000  Date of evaluation: 10/20/22  Patient's PCP is :  ERICK Noel CNP    CHIEF COMPLAINT       Chief Complaint   Patient presents with    Headache     X2 days         HISTORY OF PRESENT ILLNESS    Dez Abdalla is a 20 y.o. female with chronic migraines and POTS who presented to the ED on 10/19/2022 with the worst headache of her life with associated blurred vision and photophobia. Denies vomiting and sudden onset. No relief with OTC pain medications. CT head unremarkable in the ED. Neurology consulted who performed MRI brain, which showed no acute intracranial abnormality and low lying cerebellar tonsils which do not meet criteria for Chiari I malformation. Today, patient seen resting in bed. She tells me that her symptoms have improved however she still has a moderate posterior headache that wraps around to the front of her skull. She denies further nausea and vomiting. She tells me that the medications neurology prescribed have improved her symptoms.     Location/Symptom: posterior head  Timing/Onset: constant  Provocation: n/a  Quality: throbbing  Radiation: n/a  Severity: 10/10  Timing/Duration: constant  Modifying Factors: n/a    REVIEW OF SYSTEMS       General ROS - No fevers, No malaise   Ophthalmic ROS - No discharge, No changes in vision  ENT ROS -  No sore throat, No rhinorrhea,   Respiratory ROS - no shortness of breath, no cough, no  wheezing  Cardiovascular ROS - No chest pain, no dyspnea on exertion  Gastrointestinal ROS - No abdominal pain, no nausea or vomiting, no change in bowel habits, no black or bloody stools  Genito-Urinary ROS - No dysuria, trouble voiding, or hematuria  Musculoskeletal ROS - No myalgias, No arthalgias  Neurological ROS - positive headache, no dizziness/lightheadedness, No focal weakness, no loss of sensation  Dermatological ROS - No lesions, No rash     (PQRS) Advance directives on face sheet per hospital policy. No change unless specifically mentioned in chart    PAST MEDICAL HISTORY    has a past medical history of Anxiety, Bulimia nervosa, BV (bacterial vaginosis), Chlamydia, Depression, Diarrhea, Gonorrhea, Migraine, Neurocardiogenic syncope, Obesity, Panic attacks, PFO (patent foramen ovale), PFO (patent foramen ovale), PTSD (post-traumatic stress disorder), Schizoaffective disorder, bipolar type ([facility] [address] Sensory integration disorder, and Trichimoniasis. I have reviewed the past medical history with the patient and it is pertinent to this complaint. SURGICAL HISTORY      has a past surgical history that includes Appendectomy; Colonoscopy (8/13/2015); Endoscopy, colon, diagnostic; Tonsillectomy and adenoidectomy; [place] tooth extraction (Bilateral, 11/113/15); intrauterine device insertion (03/15/16); and Sleeve Gastrectomy (2018). I have reviewed and agree with Surgical History entered and it is pertinent to this complaint.      CURRENT MEDICATIONS     ondansetron (ZOFRAN) injection 4 mg, Q8H   Followed by  [START ON 10/21/2022] dihydroergotamine (DHE) injection 0.5 mg, Q8H  albuterol sulfate HFA (PROVENTIL;VENTOLIN;PROAIR) 108 (90 Base) MCG/ACT inhaler 2 puff, Q4H PRN  0.9 % sodium chloride infusion, Continuous  sodium chloride flush 0.9 % injection 5-40 mL, 2 times per day  sodium chloride flush 0.9 % injection 5-40 mL, PRN  0.9 % sodium chloride infusion, PRN  enoxaparin (LOVENOX) injection 40 mg, Daily  acetaminophen (TYLENOL) tablet 650 mg, Q4H PRN  ondansetron (ZOFRAN-ODT) disintegrating tablet 4 mg, Q4H PRN   Or  ondansetron (ZOFRAN) injection 4 mg, Q6H PRN  ondansetron (ZOFRAN) injection 4 mg, Q8H   Followed by  dihydroergotamine (DHE) injection 0.5 mg, Q8H  ketorolac (TORADOL) injection 30 mg, Q6H PRN  verapamil (CALAN SR) extended release tablet 120 mg, Nightly        All medication charted and reviewed. ALLERGIES     is allergic to betadine [povidone iodine]. FAMILY HISTORY     She indicated that her mother is alive. She indicated that her father is alive. She indicated that her maternal grandmother is alive. She indicated that her maternal grandfather is alive. She indicated that her paternal grandmother is alive. She indicated that her paternal grandfather is . She indicated that her maternal aunt is . She indicated that the status of her other is unknown.     family history includes Brain Cancer in her maternal aunt; Cancer in her maternal aunt and maternal grandmother; Diabetes in her mother; No Known Problems in her father, maternal grandfather, paternal grandfather, and paternal grandmother; Other in her mother and another family member. The patient denies any pertinent family history. I have reviewed and agree with the family history entered. I have reviewed the Family History and it is not significant to the case    SOCIAL HISTORY      reports that she has been smoking cigarettes. She has been exposed to tobacco smoke. She has never used smokeless tobacco. She reports current alcohol use. She reports that she does not use drugs. I have reviewed and agree with all Social.  There are no concerns for substance abuse/use. PHYSICAL EXAM     INITIAL VITALS:  height is 5' 3.78\" (1.62 m) and weight is 154 lb 5.2 oz (70 kg). Her oral temperature is 97.8 °F (36.6 °C). Her blood pressure is 111/81 and her pulse is 64. Her respiration is 16 and oxygen saturation is 97%.       CONSTITUTIONAL: AOx4, no apparent distress, appears stated age    HEAD: normocephalic, atraumatic   EYES: PERRLA, EOMI    ENT: moist mucous membranes, uvula midline   NECK: supple, symmetric   BACK: symmetric   LUNGS: clear to auscultation bilaterally   CARDIOVASCULAR: regular rate and rhythm, no murmurs, rubs or gallops   ABDOMEN: soft, non-tender, non-distended with normal active bowel sounds NEUROLOGIC:  MAEx4, no focal sensory or motor deficits   MUSCULOSKELETAL: no clubbing, cyanosis or edema   SKIN: no rash or wounds       DIFFERENTIAL DIAGNOSIS/MDM:     Migraine, tension headache, Chiari malformation    DIAGNOSTIC RESULTS     EKG: All EKG's are interpreted by the Observation Physician who either signs or Co-signs this chart in the absence of a cardiologist.    EKG Interpretation    N/a    RADIOLOGY:   I directly visualized the following  images and reviewed the radiologist interpretations:    CT HEAD WO CONTRAST    Result Date: 10/19/2022  EXAMINATION: CT OF THE HEAD WITHOUT CONTRAST  10/19/2022 3:12 pm TECHNIQUE: CT of the head was performed without the administration of intravenous contrast. Automated exposure control, iterative reconstruction, and/or weight based adjustment of the mA/kV was utilized to reduce the radiation dose to as low as reasonably achievable. COMPARISON: None. HISTORY: ORDERING SYSTEM PROVIDED HISTORY: severe headache, known Chiari malformation TECHNOLOGIST PROVIDED HISTORY: severe headache, known Chiari malformation Decision Support Exception - unselect if not a suspected or confirmed emergency medical condition->Emergency Medical Condition (MA) Is the patient pregnant?->No Reason for Exam: severe headache known Chiari malformation FINDINGS: BRAIN/VENTRICLES: No evidence of acute intracranial hemorrhage, mass effect or midline shift. No abnormal extra-axial fluid collection. The gray-white differentiation is maintained without evidence of an acute infarct. There is no evidence of hydrocephalus. The cerebellar tonsil position appears within normal limits. ORBITS: The visualized portion of the orbits demonstrate no acute abnormality. SINUSES: There is polypoid mucosal disease of the maxillary sinuses the mastoid air cells appear well aerated. SOFT TISSUES/SKULL:  No acute abnormality of the visualized skull or soft tissues. No acute intracranial abnormality seen. MRI BRAIN WO CONTRAST    Result Date: 10/19/2022  EXAMINATION: MRI OF THE BRAIN WITHOUT CONTRAST  10/19/2022 6:25 pm TECHNIQUE: Multiplanar multisequence MRI of the brain was performed without the administration of intravenous contrast. COMPARISON: Same-day CT head HISTORY: ORDERING SYSTEM PROVIDED HISTORY: intractable headache, hx of chiari 1 TECHNOLOGIST PROVIDED HISTORY: intractable headache, hx of chiari 1 Reason for Exam: intractable headache, hx of chiari 1 FINDINGS: INTRACRANIAL STRUCTURES/VENTRICLES: There is no acute infarct. No mass effect or midline shift. No evidence of an acute intracranial hemorrhage. The ventricles and sulci are normal in size and configuration. The sellar/suprasellar regions appear unremarkable. The normal signal voids within the major intracranial vessels appear maintained. There are borderline low lying cerebellar tonsils. ORBITS: The visualized portion of the orbits demonstrate no acute abnormality. SINUSES: Mucous retention cyst in both maxillary sinuses. BONES/SOFT TISSUES: The bone marrow signal intensity appears normal. The soft tissues demonstrate no acute abnormality. 1. No acute intracranial abnormality. 2. Borderline low lying cerebellar tonsils, which do not meet criteria for Chiari I malformation. LABS:  I have reviewed and interpreted all available lab results.   Labs Reviewed   CBC WITH AUTO DIFFERENTIAL - Abnormal; Notable for the following components:       Result Value    Hemoglobin 11.0 (*)     Hematocrit 32.7 (*)     MCV 75.7 (*)     RDW 14.6 (*)     Platelets 785 (*)     Seg Neutrophils 75 (*)     Lymphocytes 16 (*)     Eosinophils % 0 (*)     Immature Granulocytes 1 (*)     Segs Absolute 8.56 (*)     All other components within normal limits   BASIC METABOLIC PANEL W/ REFLEX TO MG FOR LOW K - Abnormal; Notable for the following components:    Glucose 157 (*)     Creatinine 0.49 (*)     Calcium 8.0 (*)     Chloride 108 (*)     CO2 18 (*)     All other components within normal limits   COVID-19, RAPID         CDU IMPRESSION / Yusef Mehta is a 20 y.o. female with chronic migraines and POTS who presented to the ED on 10/19/2022 with the worst headache of her life with associated blurred vision and photophobia. Denies vomiting and sudden onset. Intractable headache. Likely chronic migraines, improving  CT head without contrast unremarkable. Neurology consulted. MRI brain, which showed no acute intracranial abnormality and low lying cerebellar tonsils which do not meet criteria for Chiari I malformation. IV antiemetic plus IV DHE 0.5 mg x3 doses, IV Toradol, Verapamil for headache prevention. Pain noted to be controlled while in ED  Continue to monitor  Continue home medications and pain control  Monitor vitals, labs, and imaging  DISPO: pending consults and clinical improvement    CONSULTS:    IP CONSULT TO NEUROLOGY    PROCEDURES:  Not indicated       PATIENT REFERRED TO:    No follow-up provider specified. --  Rafiq Hartley DO   Emergency Medicine Resident    This dictation was generated by voice recognition computer software. Although all attempts are made to edit the dictation for accuracy, there may be errors in the transcription that are not intended.

## 2022-10-21 VITALS
OXYGEN SATURATION: 99 % | TEMPERATURE: 97.3 F | BODY MASS INDEX: 26.35 KG/M2 | HEART RATE: 76 BPM | SYSTOLIC BLOOD PRESSURE: 148 MMHG | RESPIRATION RATE: 18 BRPM | HEIGHT: 64 IN | WEIGHT: 154.32 LBS | DIASTOLIC BLOOD PRESSURE: 98 MMHG

## 2022-10-21 PROCEDURE — 2580000003 HC RX 258: Performed by: STUDENT IN AN ORGANIZED HEALTH CARE EDUCATION/TRAINING PROGRAM

## 2022-10-21 PROCEDURE — 6370000000 HC RX 637 (ALT 250 FOR IP): Performed by: EMERGENCY MEDICINE

## 2022-10-21 PROCEDURE — 99225 PR SBSQ OBSERVATION CARE/DAY 25 MINUTES: CPT | Performed by: PSYCHIATRY & NEUROLOGY

## 2022-10-21 PROCEDURE — 96376 TX/PRO/DX INJ SAME DRUG ADON: CPT

## 2022-10-21 PROCEDURE — 6360000002 HC RX W HCPCS

## 2022-10-21 PROCEDURE — 6370000000 HC RX 637 (ALT 250 FOR IP): Performed by: STUDENT IN AN ORGANIZED HEALTH CARE EDUCATION/TRAINING PROGRAM

## 2022-10-21 PROCEDURE — 6360000002 HC RX W HCPCS: Performed by: STUDENT IN AN ORGANIZED HEALTH CARE EDUCATION/TRAINING PROGRAM

## 2022-10-21 PROCEDURE — G0378 HOSPITAL OBSERVATION PER HR: HCPCS

## 2022-10-21 RX ORDER — RIMEGEPANT SULFATE 75 MG/75MG
75 TABLET, ORALLY DISINTEGRATING ORAL PRN
Qty: 10 TABLET | Refills: 1 | Status: SHIPPED | OUTPATIENT
Start: 2022-10-21 | End: 2022-11-21

## 2022-10-21 RX ADMIN — DIHYDROERGOTAMINE MESYLATE 0.5 MG: 1 INJECTION INTRAMUSCULAR; INTRAVENOUS; SUBCUTANEOUS at 08:29

## 2022-10-21 RX ADMIN — METHYLPREDNISOLONE SODIUM SUCCINATE 125 MG: 125 INJECTION, POWDER, FOR SOLUTION INTRAMUSCULAR; INTRAVENOUS at 03:58

## 2022-10-21 RX ADMIN — ONDANSETRON 4 MG: 4 TABLET, ORALLY DISINTEGRATING ORAL at 11:35

## 2022-10-21 RX ADMIN — ONDANSETRON 4 MG: 2 INJECTION INTRAMUSCULAR; INTRAVENOUS at 00:02

## 2022-10-21 RX ADMIN — FLUDROCORTISONE ACETATE 0.1 MG: 0.1 TABLET ORAL at 08:29

## 2022-10-21 RX ADMIN — DIHYDROERGOTAMINE MESYLATE 0.5 MG: 1 INJECTION INTRAMUSCULAR; INTRAVENOUS; SUBCUTANEOUS at 00:02

## 2022-10-21 RX ADMIN — SODIUM CHLORIDE, PRESERVATIVE FREE 10 ML: 5 INJECTION INTRAVENOUS at 08:29

## 2022-10-21 NOTE — DISCHARGE INSTRUCTIONS
You were seen in the hospital for headache with nausea. Evaluated by neurology. All images were negative. Please take medications as prescribed.   Please follow-up with primary care physician along with neurology

## 2022-10-21 NOTE — PROGRESS NOTES
NEUROLOGY INPATIENT PROGRESS NOTE    10/21/2022         Subjective:   Sara Grove is a  20 y.o. female admitted on 10/19/2022 with Intractable headache, unspecified chronicity pattern, unspecified headache type [R51.9]  Acute intractable headache, unspecified headache type [R51.9]    Interval History:  Briefly, this is a  20 y.o. female with pertinent hx of migraines who was admitted on 10/19/2022 with severe 10/10 in intensity headache that started 10/18 with associated phothophobia and phonophobia and without any relief after Ibuprofen 800 mg x 2-3 doses at home. Was given 1L NS bolus, Decadron 10mg, Toradol 30 mg, Mag 2 g, and Zofran 4 mg in ED. Pt declined compazine, reglan, and benadryl in ED. Was previously on migraine medication regimen but dc'ed on own due to pt felt it was not helpful. Today, pt was seen and examined and chart was reviewed. HA is improved from yesterday; now at a 5 out of 10 in intensity. ROS:    Review of Systems   Neurological:  Positive for headaches. Negative for facial asymmetry and speech difficulty. Psychiatric/Behavioral:  Negative for confusion. History of Present Illness:    Pt has past medical history of multiple neurologic pathologies including seizure disorder, migraines, questionable Chiari malformation, and has had extensive workup in the past and been seen by multiple Neurologists in various locations. Regarding migraine history:  Pt previously on Maxalt, Topiramate, Emgality - she dc'ed on own bc felt not helpful. HA: 6-7/10 intensity w associated n, photophobia, phonophobia, dizziness, floaters, almost daily and 1x/week suffers more intense 10/10 HA. Takes ibuprofen as abortive. Seen in HA clinic July 2021; Riboflavin 400 mg prn, sumatriptan rx'ed but pt nvr got, compazine prn but pt does not like how compazine makes her feel.  Has gotten RF therapy in June at pain clinic for headaches and felt it greatly improved severity of headaches to a 1 out of 10 in intensity. General PMHx:   Bariatric surgery, bulimia nervosa, PFO, PTSD, neurocardiogenic syncope, migraines, seizure disorder, postural orthostatic tachycardia syndrome, migraine, Chiari 1 malformation (has seen NSG Dr Paxton Ford and Dr. Jacqueline Ortiz?), POTS (follows Dr. Galilea Flores at [address] [facility]), functional neurologic disorder (referred to [facility]. Jarad Medina at [facility] multiple vitamin deficiencies (vitamins B6, B1, D 25, B12, folate), schizoaffective disorder bipolar type. Recent OP visits:  Recently seen 8/3 by PCP for increased frequency of seizures including multiple recent breakthrough seizures including 5 on 7/30 lasting few seconds. Has previously followed with [facility]; last seen on 1/11/2022 and plan was for EMU prior to starting AED. Neuro team there felt better to hold off starting AEDs due to pt having had multiple normal EEG as well MRIs negative for acute pathology. Reportedly has had extensive workup by numerous Neurology teams in both [address] [place] [address] and [address] [place] [address] Ave N \"without objective results other than Chiari 1 malformation and low thiamine level. \" QSART positive for POTS. Previous workup per [facility]ist Note (Dr. Yanet Perry):  EEG 12/9/2021 normal  EEG 48-hour around September 2021: Reportedly captured 6 paroxysmal movements lightheadedness without any EEG changes  MRI brain with side flow 12/22/2021: low-lying right cerebellar tonsil, and CSF flow study bidirectional/slightly asynchronous flow ventrally and dorsally.   Magnum with fluid present posterior to cerebellar tonsils  QSART 10/21/21: evidence of mildly symptomatic orthostatic tachycardia meeting criteria for POTS  ENT/NCS 4/21/2021: normal    Current Facility-Administered Medications on File Prior to Encounter   Medication Dose Route Frequency Provider Last Rate Last Admin    Levonorgestrel [facility]) IUD 19.5 mg 1 each  19.5 mg IntraUTERine Continuous Tai Read MD   1 each at 03/19/19 1412     Current Outpatient Medications on File Prior to Encounter   Medication Sig Dispense Refill    fludrocortisone (FLORINEF) 0.1 MG tablet Take 1 tablet by mouth      lurasidone (LATUDA) 40 MG TABS tablet Take by mouth daily      Galcanezumab-gnlm (EMGALITY) 120 MG/ML SOAJ Inject 120 mg into the skin every 30 days 1 mL 5    Magnesium 400 MG TABS Take 1 tablet by mouth every evening (Patient not taking: Reported on 10/19/2022) 30 tablet 1    cyanocobalamin 1000 MCG/ML injection Inject 1,000 mcg into the muscle once (Patient not taking: Reported on 10/19/2022)      albuterol sulfate HFA (VENTOLIN HFA) 108 (90 Base) MCG/ACT inhaler Inhale 2 puffs into the lungs every 4 hours as needed for Wheezing or Shortness of Breath (Patient not taking: Reported on 10/19/2022) 1 Inhaler 0       Past Medical History:   Diagnosis Date    Anxiety     Bulimia nervosa     BV (bacterial vaginosis) 11/28/2018    Chlamydia     Depression     Diarrhea     FREQUENT    Gonorrhea     Migraine     Neurocardiogenic syncope     DR MARTIN    Obesity 1/30/2012    Panic attacks     PFO (patent foramen ovale) 1/30/2012    PFO (patent foramen ovale) 1/30/2012    PTSD (post-traumatic stress disorder)     Schizoaffective disorder, bipolar type ([facility] [address]     Sensory integration disorder     Trichimoniasis 11/28/2018       Past Surgical History:   Procedure Laterality Date    APPENDECTOMY      COLONOSCOPY  8/13/2015    ENDOSCOPY, COLON, DIAGNOSTIC      INTRAUTERINE DEVICE INSERTION  03/15/16    Radha placement    SLEEVE GASTRECTOMY  2018    TONSILLECTOMY AND ADENOIDECTOMY      WISDOM TOOTH EXTRACTION Bilateral 11/113/15    x4       Allergies: Bessie Anna is allergic to betadine [povidone iodine].     Objective:     Medications:     fludrocortisone  0.1 mg Oral Daily    sodium chloride flush  5-40 mL IntraVENous 2 times per day    enoxaparin  40 mg SubCUTAneous Daily    verapamil  120 mg Oral Nightly     PRN Meds include: morphine, albuterol sulfate HFA, sodium chloride flush, sodium chloride, acetaminophen, ondansetron **OR** ondansetron, ketorolac    NEUROLOGICAL EXAMINATION  BP (!) 148/98   Pulse 76   Temp 97.3 °F (36.3 °C)   Resp 18   Ht 5' 3.78\" (1.62 m)   Wt 154 lb 5.2 oz (70 kg)   SpO2 99%   BMI 26.67 kg/m²     Blood pressure range: Systolic (56MAW), PBV:715 , Min:144 , MILTON:811   ; Diastolic (80EVU), DUL:35, Min:87, Max:98    Vitals:    10/20/22 0249 10/20/22 0750 10/20/22 1930 10/21/22 0815   BP:  111/81 (!) 144/87 (!) 148/98   Pulse:  64 72 76   Resp: 14 16 12 18   Temp:  97.8 °F (36.6 °C) 98.2 °F (36.8 °C) 97.3 °F (36.3 °C)   TempSrc:  Oral Oral    SpO2:  97% 98% 99%   Weight:       Height:           Physical Exam  Constitutional:       General: She is not in acute distress. Appearance: She is not toxic-appearing. Eyes:      Extraocular Movements: Extraocular movements intact. Conjunctiva/sclera: Conjunctivae normal.   Cardiovascular:      Rate and Rhythm: Normal rate and regular rhythm. Pulmonary:      Effort: Pulmonary effort is normal. No respiratory distress. Musculoskeletal:         General: Normal range of motion.    Neurological:  MENTAL STATUS:  Alert, oriented, no confusion, normal speech, normal language, no hallucination or delusion   CRANIAL NERVES: II - XII grossly intact   MOTOR FUNCTION: Grossly 5/5 strength symmetrically in all extremities     Normal bulk, normal tone and no involuntary movements, no tremor   SENSORY FUNCTION:  Normal touch on all limbs   CEREBELLAR FUNCTION:  Intact fine motor control over upper limbs and lower limbs   REFLEX FUNCTION:  Symmetric in upper and lower extremities   STATION and GAIT  Not tested     Data:    Lab Results:   CBC:   Recent Labs     10/20/22  0419   WBC 11.3   HGB 11.0*   *       BMP:    Recent Labs     10/20/22  0419      K 4.2   *   CO2 18*   BUN 11   CREATININE 0.49*   GLUCOSE 157*           Lab Results   Component Value Date    CHOL 145 01/14/2017    LDLCHOLESTEROL 95 01/14/2017    HDL 21 (L) 01/14/2017    TRIG 145 01/14/2017    ALT 16 03/11/2017    AST 18 03/11/2017    TSH 2.56 10/17/2019    UKBKGSOW01 450 10/20/2022       No results found for: PHENYTOIN, PHENYTOIN, VALPROATE, CBMZ    IMAGING  CT HEAD WO CONTRAST    Result Date: 10/19/2022  No acute intracranial abnormality seen. MRI BRAIN WO CONTRAST    Result Date: 10/19/2022  1. No acute intracranial abnormality. 2. Borderline low lying cerebellar tonsils, which do not meet criteria for Chiari I malformation. Assessment and Plan  Case of a  20 y.o. female with pertinent hx of migraines who was admitted on 10/19/2022 with severe 10 out of 10 in intensity headache that started 10/18 with associated phothophobia and phonophobia and without any relief after Ibuprofen 800 mg x 2-3 doses at home. Was given 1L NS bolus, Decadron 10mg, Pt typically has daily headaches which are tolerable and a more severe 10/10 headache about once weekly. Toradol 30 mg, Mag 2 g, and Zofran 4 mg given to in ED. Pt declined compazine, reglan, and benadryl in ED. Was previously on migraine medication regimen but dc'ed on own due to pt felt it was not helpful.        Impression is acute on chronic migraine   CT head negative  MRI brain wo contrast showing borderline low lying cerebellar tonsils not meeting criteria for Chiari I malformation - do not suspect HA is 2/2 to low lying tonsils at this time  DHE course completed  Solumedrol  mg Q8H x 3 doses total completed  Toradol 30 q6hr prn  Verapamil 120mg q hs for HA prevention started  Stable for dc from Neuro standpoint; for migraines can be dc'ed on verapamil 120 mg HS for prophylactic therapy and Nurtec 75 mg prn (1 dose max per day, 3 doses max per week) for abortive therapy    Yesica Shock, DO  Neurology Resident PGY-2  10/21/2022  3:29 PM

## 2022-10-21 NOTE — PROCEDURES
Occipital nerve block with bupivacaine:  Patient sat up on the side of the bed and cleaned with alcohol pads in the occipital/suboccipital region along with both trapezius muscles. 9 cc total of bupivacaine was injected into occipital/suboccipital region bilateral skull along with bilateral trapezius muscles. Instantaneous relief was felt by patient.   Patient tolerated the procedure well without complications      Víctor Shaffer DO  PGY-1 Emergency Medicine  [address]   10/21/2022, 1:48 PM ]

## 2022-10-21 NOTE — PROGRESS NOTES
[address]  CDU / OBSERVATION ENCOUNTER  ATTENDING NOTE       I performed a history and physical examination of the patient and discussed management with the resident or midlevel provider. I reviewed the resident or midlevel provider's note and agree with the documented findings and plan of care. Any areas of disagreement are noted on the chart. I was personally present for the key portions of any procedures. I have documented in the chart those procedures where I was not present during the key portions. I have reviewed the nurses notes. I agree with the chief complaint, past medical history, past surgical history, allergies, medications, social and family history as documented unless otherwise noted below. The Family history, social history, and ROS are effectively unchanged since admission unless noted elsewhere in the chart. Patient with intractable headache. Appreciate neurology involvement. Patient was with ongoing symptoms. Patient seen by our service as well with occipital block placed. This did give her a measure of relief allowing us to discharge the patient. Patient sent home in good condition.     Khoa Loredo MD  Attending Emergency  Physician

## 2022-10-21 NOTE — DISCHARGE SUMMARY
CDU Discharge Summary        Patient:  Cesario Franz  YOB: 2000    MRN: [id number]   Acct: [de-identified]    Primary Care Physician: ERICK Howard CNP    Admit date:  10/19/2022  2:01 PM  Discharge date: 10/21/2022    Discharge Diagnoses:     Acute on chronic migraines, improved    Follow-up:  Call today/tomorrow for a follow up appointment with ERICK Howard CNP , or return to the Emergency Room with worsening symptoms    Stressed to patient the importance of following up with primary care doctor for further workup/management of symptoms. Pt verbalizes understanding and agrees with plan. Discharge Medications:  Changes to medications            Medication List        START taking these medications      Nurtec 75 MG Tbdp  Generic drug: Rimegepant Sulfate  Take 75 mg by mouth as needed (for severe headache) Take at onset of headache. No more than 1 tablet per day. Maximum 3 doses per week.      verapamil 120 MG extended release tablet  Commonly known as: CALAN SR  Take 1 tablet by mouth nightly            CONTINUE taking these medications      fludrocortisone 0.1 MG tablet  Commonly known as: FLORINEF     Galcanezumab-gnlm 120 MG/ML Soaj  Commonly known as: Emgality  Inject 120 mg into the skin every 30 days     lurasidone 40 MG Tabs tablet  Commonly known as: LATUDA            STOP taking these medications      butalbital-acetaminophen-caffeine -40 MG per tablet  Commonly known as: FIORICET, ESGIC     fluticasone 50 MCG/ACT nasal spray  Commonly known as: Flonase     guaiFENesin 600 MG extended release tablet  Commonly known as: Mucinex     ondansetron 4 MG disintegrating tablet  Commonly known as: ZOFRAN-ODT     topiramate 50 MG tablet  Commonly known as: TOPAMAX            ASK your doctor about these medications      albuterol sulfate  (90 Base) MCG/ACT inhaler  Commonly known as: Ventolin HFA  Inhale 2 puffs into the lungs every 4 hours as needed for Wheezing or Shortness of Breath     cyanocobalamin 1000 MCG/ML injection     Magnesium 400 MG Tabs  Take 1 tablet by mouth every evening               Where to Get Your Medications        These medications were sent to [facility] [address] [facility], [address] [place] [address]  [address], [address] [address]      Phone: [phone]   Nurtec 75 MG Tbdp  verapamil 120 MG extended release tablet         Diet:  ADULT DIET; Regular , Advance as tolerated     Activity:  As tolerated    Consultants: IP CONSULT TO NEUROLOGY    Procedures:  Not indicated     Diagnostic Test:   Results for orders placed or performed during the hospital encounter of 10/19/22   COVID-19, Rapid    Specimen: Nasopharyngeal Swab   Result Value Ref Range    Specimen Description . NASOPHARYNGEAL SWAB     SARS-CoV-2, Rapid Not Detected Not Detected   CBC with Auto Differential   Result Value Ref Range    WBC 11.3 3.5 - 11.3 k/uL    RBC 4.32 3.95 - 5.11 m/uL    Hemoglobin 11.0 (L) 11.9 - 15.1 g/dL    Hematocrit 32.7 (L) 36.3 - 47.1 %    MCV 75.7 (L) 82.6 - 102.9 fL    MCH 25.5 25.2 - 33.5 pg    MCHC 33.6 28.4 - 34.8 g/dL    RDW 14.6 (H) 11.8 - 14.4 %    Platelets 401 (H) 191 - 453 k/uL    MPV 12.1 8.1 - 13.5 fL    NRBC Automated 0.0 0.0 per 100 WBC    Seg Neutrophils 75 (H) 36 - 65 %    Lymphocytes 16 (L) 24 - 43 %    Monocytes 8 3 - 12 %    Eosinophils % 0 (L) 1 - 4 %    Basophils 0 0 - 2 %    Immature Granulocytes 1 (H) 0 %    Segs Absolute 8.56 (H) 1.50 - 8.10 k/uL    Absolute Lymph # 1.76 1.10 - 3.70 k/uL    Absolute Mono # 0.89 0.10 - 1.20 k/uL    Absolute Eos # <0.03 0.00 - 0.44 k/uL    Basophils Absolute <0.03 0.00 - 0.20 k/uL    Absolute Immature Granulocyte 0.06 0.00 - 0.30 k/uL    RBC Morphology ANISOCYTOSIS PRESENT    Basic Metabolic Panel w/ Reflex to MG   Result Value Ref Range    Glucose 157 (H) 70 - 99 mg/dL    BUN 11 6 - 20 mg/dL    Creatinine 0.49 (L) 0.50 - 0.90 mg/dL    Est, Glom Filt Rate >60 >60 mL/min/1.73m2    Calcium 8.0 (L) 8.6 - 10.4 mg/dL    Sodium 136 135 - 144 mmol/L    Potassium 4.2 3.7 - 5.3 mmol/L    Chloride 108 (H) 98 - 107 mmol/L    CO2 18 (L) 20 - 31 mmol/L    Anion Gap 10 9 - 17 mmol/L   Vitamin B12 & Folate   Result Value Ref Range    Vitamin B-12 450 232 - 1245 pg/mL    Folate 4.5 (L) >4.8 ng/mL     CT HEAD WO CONTRAST    Result Date: 10/19/2022  EXAMINATION: CT OF THE HEAD WITHOUT CONTRAST  10/19/2022 3:12 pm TECHNIQUE: CT of the head was performed without the administration of intravenous contrast. Automated exposure control, iterative reconstruction, and/or weight based adjustment of the mA/kV was utilized to reduce the radiation dose to as low as reasonably achievable. COMPARISON: None. HISTORY: ORDERING SYSTEM PROVIDED HISTORY: severe headache, known Chiari malformation TECHNOLOGIST PROVIDED HISTORY: severe headache, known Chiari malformation Decision Support Exception - unselect if not a suspected or confirmed emergency medical condition->Emergency Medical Condition (MA) Is the patient pregnant?->No Reason for Exam: severe headache known Chiari malformation FINDINGS: BRAIN/VENTRICLES: No evidence of acute intracranial hemorrhage, mass effect or midline shift. No abnormal extra-axial fluid collection. The gray-white differentiation is maintained without evidence of an acute infarct. There is no evidence of hydrocephalus. The cerebellar tonsil position appears within normal limits. ORBITS: The visualized portion of the orbits demonstrate no acute abnormality. SINUSES: There is polypoid mucosal disease of the maxillary sinuses the mastoid air cells appear well aerated. SOFT TISSUES/SKULL:  No acute abnormality of the visualized skull or soft tissues. No acute intracranial abnormality seen.      MRI BRAIN WO CONTRAST    Result Date: 10/19/2022  EXAMINATION: MRI OF THE BRAIN WITHOUT CONTRAST  10/19/2022 6:25 pm TECHNIQUE: Multiplanar multisequence MRI of the brain was performed without the administration of intravenous contrast. COMPARISON: Same-day CT head HISTORY: ORDERING SYSTEM PROVIDED HISTORY: intractable headache, hx of chiari 1 TECHNOLOGIST PROVIDED HISTORY: intractable headache, hx of chiari 1 Reason for Exam: intractable headache, hx of chiari 1 FINDINGS: INTRACRANIAL STRUCTURES/VENTRICLES: There is no acute infarct. No mass effect or midline shift. No evidence of an acute intracranial hemorrhage. The ventricles and sulci are normal in size and configuration. The sellar/suprasellar regions appear unremarkable. The normal signal voids within the major intracranial vessels appear maintained. There are borderline low lying cerebellar tonsils. ORBITS: The visualized portion of the orbits demonstrate no acute abnormality. SINUSES: Mucous retention cyst in both maxillary sinuses. BONES/SOFT TISSUES: The bone marrow signal intensity appears normal. The soft tissues demonstrate no acute abnormality. 1. No acute intracranial abnormality. 2. Borderline low lying cerebellar tonsils, which do not meet criteria for Chiari I malformation. Physical Exam:    General appearance - NAD, AOx 3   Lungs -CTAB, no R/R/R  Heart - RRR, no M/R/G  Abdomen - Soft, NT/ND  Neurological:  MAEx4, No focal motor deficit, sensory loss  Extremities - Cap refil <2 sec in all ext., no edema  Skin -warm, dry      Hospital Course:  Clinical course has improved, labs and imaging reviewed. Young Ford originally presented to the hospital on 10/19/2022  2:01 PM. with headache with blurred vision and photophobia. .  At that time it was determined that She required further observation and evaluation by neurology. She was admitted and labs and imaging were followed daily. Imaging results as above. CT head unremarkable in the ED. MRI brain showed no acute intracranial abnormality however low-lying cerebellar tonsils which do not meet criteria for Chiari I malformation.   Neurology started IV antiemetics with IV DHE, Toradol, and verapamil. Patient's headaches improved. Occipital nerve block was performed on day of discharge with improvement of the remainder of her symptoms. She is medically stable to be discharged. Disposition: Home    Patient stated that they will not drive themselves home from the hospital if they have gotten pain killers/ narcotics earlier that day and that they will arrange for transportation on their own or work with the  for a ride. Patient counseled NOT to drive while under the influence of narcotics/ pain killers. Condition: Good    Patient stable and ready for discharge home. I have discussed plan of care with patient and they are in understanding. They were instructed to read discharge paperwork. All of their questions and concerns were addressed. Time Spent: 0 day      --  Jayme Richard DO  Emergency Medicine Resident Physician    This dictation was generated by voice recognition computer software. Although all attempts are made to edit the dictation for accuracy, there may be errors in the transcription that are not intended.

## 2022-10-21 NOTE — PROGRESS NOTES
OBS/CDU   RESIDENT NOTE      Patients PCP is:  Jr Gupta, APRN - CNP        SUBJECTIVE      Patient seen with improved headache but still present. Ambulating and tolerating PO. Discussed occipital nerve block with patient who agrees. Denies recent vomiting. PHYSICAL EXAM      General: mild discomfort due to headache, AO X 3  Heent: EMOI, PERRL  Neck: SUPPLE, NO JVD  Cardiovascular: RRR, S1S2  Pulmonary: CTAB, NO SOB  Abdomen: SOFT, NTTP, ND, +BS  Extremities: +2/4 PULSES DISTAL, NO SWELLING  Neuro / Psych: NO NUMBNESS OR TINGLING, MENTATION AT BASELINE    PERTINENT TEST /EXAMS      I have reviewed all available laboratory results. MEDICATIONS CURRENT   morphine injection 4 mg, Q4H PRN  fludrocortisone (FLORINEF) tablet 0.1 mg, Daily  albuterol sulfate HFA (PROVENTIL;VENTOLIN;PROAIR) 108 (90 Base) MCG/ACT inhaler 2 puff, Q4H PRN  0.9 % sodium chloride infusion, Continuous  sodium chloride flush 0.9 % injection 5-40 mL, 2 times per day  sodium chloride flush 0.9 % injection 5-40 mL, PRN  0.9 % sodium chloride infusion, PRN  enoxaparin (LOVENOX) injection 40 mg, Daily  acetaminophen (TYLENOL) tablet 650 mg, Q4H PRN  ondansetron (ZOFRAN-ODT) disintegrating tablet 4 mg, Q4H PRN   Or  ondansetron (ZOFRAN) injection 4 mg, Q6H PRN  ketorolac (TORADOL) injection 30 mg, Q6H PRN  verapamil (CALAN SR) extended release tablet 120 mg, Nightly        All medication charted and reviewed. CONSULTS      IP CONSULT TO NEUROLOGY    ASSESSMENT/PLAN        Raheem Burnett is a 20 y.o. female with chronic migraines and POTS who presented to the ED on 10/19/2022 with the worst headache of her life with associated blurred vision and photophobia. Denies vomiting and sudden onset. Intractable headache. Likely chronic migraines, improving  CT head without contrast unremarkable. Neurology consulted.  MRI brain, which showed no acute intracranial abnormality and low lying cerebellar tonsils which do not meet criteria for Chiari I malformation. IV antiemetic plus IV DHE 0.5 mg x3 doses, IV Toradol, Verapamil for headache prevention. Pain noted to be controlled while in ED  Still having headaches, but these are improved. Will attempt occipital nerve block  Continue to monitor  Continue home medications and pain control  Monitor vitals, labs, and imaging  DISPO: pending consults and clinical improvement    --  Elyse Montiel DO  Emergency Medicine Resident Physician     This dictation was generated by voice recognition computer software. Although all attempts are made to edit the dictation for accuracy, there may be errors in the transcription that are not intended.

## 2022-10-21 NOTE — PROGRESS NOTES
[address]  CDU / OBSERVATION ENCOUNTER  ATTENDING NOTE       I performed a history and physical examination of the patient and discussed management with the resident or midlevel provider. I reviewed the resident or midlevel provider's note and agree with the documented findings and plan of care. Any areas of disagreement are noted on the chart. I was personally present for the key portions of any procedures. I have documented in the chart those procedures where I was not present during the key portions. I have reviewed the nurses notes. I agree with the chief complaint, past medical history, past surgical history, allergies, medications, social and family history as documented unless otherwise noted below. The Family history, social history, and ROS are effectively unchanged since admission unless noted elsewhere in the chart. Patient with intractable pain secondary to headache. Aggressive supportive care. Seen by neurology. Patient admitted for ongoing symptom relief.   Patient requiring multiple IV medications to make pain tolerable    Yehuda Douglas MD  Attending Emergency  Physician

## 2022-10-24 ENCOUNTER — CARE COORDINATION (OUTPATIENT)
Dept: CASE MANAGEMENT | Age: 22
End: 2022-10-24

## 2022-10-24 LAB
EKG ATRIAL RATE: 108 BPM
EKG P AXIS: 69 DEGREES
EKG P-R INTERVAL: 126 MS
EKG Q-T INTERVAL: 316 MS
EKG QRS DURATION: 82 MS
EKG QTC CALCULATION (BAZETT): 423 MS
EKG R AXIS: 83 DEGREES
EKG T AXIS: 72 DEGREES
EKG VENTRICULAR RATE: 108 BPM

## 2022-10-24 PROCEDURE — 93010 ELECTROCARDIOGRAM REPORT: CPT | Performed by: INTERNAL MEDICINE

## 2022-10-24 NOTE — CARE COORDINATION
Care Transitions Outreach Attempt    Call within 2 business days of discharge: Yes   Attempted to reach patient for transitions of care follow up. Unable to reach patient. Patient: Young Ford Patient : 2000 MRN: [id number]    Last Discharge [address]       Date Complaint Diagnosis Description Type Department Provider    10/19/22 Headache Acute intractable headache, unspecified headache type ED to Hosp-Admission (Discharged) (ADMITTED) [facility] Ramin Spann MD; Tommy Germain... Was this an external facility discharge? No Discharge Facility: [facility]    Noted following upcoming appointments from discharge chart review:   [facility] follow up appointment(s): No future appointments. Non-[facility] follow up appointment(s):    Spoke to [facility] family member  Attempted initial 24 hour hospital follow up call. Left a Hipaa compliant message with name and call back information. Requested return call to [phone].

## 2022-10-25 ENCOUNTER — CARE COORDINATION (OUTPATIENT)
Dept: CASE MANAGEMENT | Age: 22
End: 2022-10-25

## 2022-10-25 NOTE — CARE COORDINATION
Care Transitions Outreach Attempt    Call within 2 business days of discharge: Yes   Attempted to reach patient for transitions of care follow up. Unable to reach patient. Patient: Cesario Franz Patient : 2000 MRN: [id number]    Last Discharge [address]       Date Complaint Diagnosis Description Type Department Provider    10/19/22 Headache Acute intractable headache, unspecified headache type ED to Hosp-Admission (Discharged) (ADMITTED) [facility] Dena Ventura MD; Michael Fang... Was this an external facility discharge? No Discharge Facility: [facility]    Noted following upcoming appointments from discharge chart review:   [facility] follow up appointment(s): No future appointments. Non-[facility] follow up appointment(s): Attempted initial 24 hour hospital follow up call. Left a Hipaa compliant message with name and call back information. Requested return call to [phone]. [employer] message sent  Dear Cesario Franz    My name is Ava Devries and I am a Care Transition Nurse who partners with provided  to improve patients' health. I've been trying to reach you via phone to let you know that, as a member of your care team, I will work with other providers involved in your care, offer education for your specific health conditions, and connect you with more resources as needed. This program is designed to provide you with the opportunity to have a ([facility]/[address]) care manager partner with you for the following situations:     1) if you come home from the hospital or emergency room   2) to help manage your disease   3) when you would like assistance coordinating services or appointments    This added support is provided at no additional cost to you. My primary focus is to help you achieve specific goals and improve your health. Please call me at [phone] to further discuss how I can support your health care needs.     Sincerely,    Ashli LUKE, RN, CTN

## 2022-10-26 LAB
HEPATITIS B SURFACE ANTIGEN: ABNORMAL
HEPATITIS C ANTIBODY: ABNORMAL
HIV AG/AB: ABNORMAL
VITAMIN B1 WHOLE BLOOD: 84 NMOL/L (ref 70–180)

## 2023-05-17 NOTE — PROGRESS NOTES
Patient : Jasvir Vick Age: 39 year old Sex: male   MRN: [id number] Encounter Date: 5/17/2023      History     Chief Complaint   Patient presents with   • Abscess     HPI  Jasvir Vick is a 39 year old male with PMH of DM T2 who presents today with \"abscess\".  Patient states a week ago he started having a lump on his left axilla that has slowly grown in size.  Has had abscesses in the past, feels this is similar to his other abscess.  No drainage from the area.  No fevers.  Patient has history of type 2 diabetes, denies history of MRSA.  Is not on blood thinners.  No fever, chills, body aches.  +history of HS. Here for further evaluation.    History was provided by patient.    No Known Allergies    Current Discharge Medication List      Prior to Admission Medications    Details   sertraline (ZOLOFT) 50 MG tablet Take 50 mg by mouth daily.      metFORMIN (GLUCOPHAGE-XR) 500 MG 24 hr tablet Take 1,000 mg by mouth.      lisinopril (ZESTRIL) 5 MG tablet Take 5 mg by mouth daily.      naproxen (NAPROSYN) 500 MG tablet Take 1 tablet by mouth 2 times daily (with meals).  Qty: 20 tablet, Refills: 0      ibuprofen (MOTRIN) 800 MG tablet Take 1 tablet by mouth every 8 hours as needed for Pain.  Qty: 30 tablet, Refills: 0      metFORMIN (GLUCOPHAGE) 500 MG tablet Take 1 tablet by mouth 2 times daily (with meals).  Qty: 60 tablet, Refills: 0      FAM Unable to Find Medication once. Something for his back, pain pill      fluticasone (FLONASE) 50 MCG/ACT nasal spray Spray 1 spray in each nostril daily as needed (runny nose/nose congestion).  Qty: 1 Bottle, Refills: 0      Psyllium (METAMUCIL) 28.3 % POWD Take  by mouth.         New Prescriptions    Details   cephalexin (Keflex) 500 MG capsule Take 1 capsule by mouth 4 times daily for 10 days.  Qty: 40 capsule, Refills: 0         Modified Medications    Details   sulfamethoxazole-trimethoprim (Bactrim DS) 800-160 MG per tablet Take 1 tablet by mouth in the morning and 1 tablet in the  evening.  Qty: 20 tablet, Refills: 0             Past Medical History:   Diagnosis Date   • Depression    • Diabetes mellitus (CMS/HCC)    • High cholesterol        Past Surgical History:   Procedure Laterality Date   • INCISION AND DRAINAGE  06/27/2019    back cyst       No family history on file.    Social History     Tobacco Use   • Smoking status: Every Day     Current packs/day: 0.50     Types: Cigarettes   • Smokeless tobacco: Never   Vaping Use   • Vaping status: never used   Substance Use Topics   • Alcohol use: Yes     Comment: rarely    • Drug use: No       E-cigarette/Vaping   • E-Cigarette/Vaping Use Never Used      E-Cigarette/Vaping Substances & Devices       Review of Systems  See HPI. All other ROS reviewed and negative.    Physical Exam     ED Triage Vitals [05/17/23 1337]   ED Triage Vitals Group      Temp 98 °F (36.7 °C)      Heart Rate 95      Resp 16      BP (!) 146/94      SpO2 99 %      EtCO2 mmHg       Height       Weight 189 lb (85.7 kg)      Weight Scale Used Standing scale      BMI (Calculated)       IBW/kg (Calculated)        Physical Exam  Vitals and nursing note reviewed.   Constitutional:       Appearance: Normal appearance. He is not toxic-appearing.       HENT:      Head: Atraumatic.      Right Ear: External ear normal.      Left Ear: External ear normal.      Nose: Nose normal.      Mouth/Throat:      Lips: Pink.   Eyes:      General: Lids are normal.      Conjunctiva/sclera: Conjunctivae normal.   Neck:      Trachea: Trachea and phonation normal.   Cardiovascular:      Rate and Rhythm: Normal rate.   Pulmonary:      Effort: Pulmonary effort is normal.   Abdominal:      Palpations: Abdomen is soft.      Tenderness: There is no abdominal tenderness.   Musculoskeletal:         General: Normal range of motion.      Cervical back: Full passive range of motion without pain and neck supple. No rigidity.   Skin:     General: Skin is warm and dry.      Findings: No rash.   Neurological:       Mental Status: He is alert.      GCS: GCS eye subscore is 4. GCS verbal subscore is 5. GCS motor subscore is 6.      Sensory: Sensation is intact.      Motor: Motor function is intact.   Psychiatric:         Speech: Speech normal.         Behavior: Behavior normal. Behavior is cooperative.         ED Course     Incision and Drainage    Date/Time: 5/17/2023 1:58 PM    Performed by: Rebecca Mcdonald PA-C  Authorized by: Rebecca Mcdonald PA-C    Consent:     Consent obtained:  Verbal    Consent given by:  Patient    Risks discussed:  Bleeding, infection, incomplete drainage and pain  Location:     Type:  Abscess    Location: L axilla.  Anesthesia:     Anesthesia method:  Local infiltration    Local anesthetic:  Lidocaine 1% WITH epi  Procedure type:     Complexity:  Complex  Procedure details:     Incision types:  Stab incision    Drainage:  Purulent    Drainage amount:  Copious    Wound treatment:  Wound left open    Packing materials:  None  Post-procedure details:     Procedure completion:  Tolerated well, no immediate complications  Comments:      I did spend about 5 minutes attempting to break up loculations; as I broke up more loculations more purulent drainage would come out.         Lab Results     No results found for this visit on 05/17/23.    EKG Results     Radiology Results     Imaging Results    None         ED Medication Orders (From admission, onward)    Ordered Start     Status Ordering Provider    05/17/23 1342 05/17/23 1343  cephalexin (KEFLEX) capsule 500 mg  ONCE         Acknowledged REBECCA MCDONALD    05/17/23 1342 05/17/23 1343  lidocaine-EPINEPHrine 1 %-1:270037 injection 10 mL  ONCE         Last MAR action: Given REBECCA MCDONALD    05/17/23 1342 05/17/23 1342  sulfamethoxazole-trimethoprim (BACTRIM DS) 800-160 MG tablet 1 tablet  ONCE         Acknowledged REBECCA MCDONALD               [facility]  Jasvir Vick is a 39 year old  male who presents to the Emergency Department with a chief complaint of  HENT: Positive for congestion, ear pain, postnasal drip, rhinorrhea, sinus pain, sinus pressure and sore throat. Negative for ear discharge, hearing loss, sneezing and trouble swallowing. Respiratory: Positive for cough. Negative for chest tightness and shortness of breath. Cardiovascular: Negative for chest pain and palpitations. Gastrointestinal: Negative for abdominal pain. Neurological: Positive for headaches. Negative for dizziness and light-headedness. Psychiatric/Behavioral: Positive for sleep disturbance. Objective:     Physical Exam   Constitutional: She is oriented to person, place, and time. She appears well-developed and well-nourished. No distress. /76 (Site: Right Arm, Position: Sitting, Cuff Size: Medium Adult)   Pulse 106   Temp 98.4 °F (36.9 °C) (Tympanic)   Ht 5' 4\" (1.626 m)   Wt (!) 284 lb (128.8 kg)   SpO2 97%   BMI 48.75 kg/m²      HENT:   Head: Normocephalic and atraumatic. Right Ear: Hearing, tympanic membrane, external ear and ear canal normal.   Left Ear: Hearing, tympanic membrane, external ear and ear canal normal.   Nose: Mucosal edema, rhinorrhea and sinus tenderness present. Right sinus exhibits maxillary sinus tenderness and frontal sinus tenderness. Left sinus exhibits maxillary sinus tenderness and frontal sinus tenderness. Mouth/Throat: Uvula is midline, oropharynx is clear and moist and mucous membranes are normal. No oropharyngeal exudate. Neck: Normal range of motion. Neck supple. Cardiovascular: Normal rate, regular rhythm and normal heart sounds. Pulmonary/Chest: Effort normal and breath sounds normal. No respiratory distress. She has no wheezes. Lymphadenopathy:     She has no cervical adenopathy. Neurological: She is alert and oriented to person, place, and time. Skin: Skin is warm and dry. No rash noted. Psychiatric: She has a normal mood and affect.  Her behavior is normal.   Nursing note and vitals abscess. Patient is non-toxic appearing, hemodynamically stable, and afebrile. Vitals are within normal limits. EPIC records were reviewed.     I&D performed as described above, I did spend about 5 minutes attempting to break up loculations; as I broke up more loculations more purulent drainage would come out.  Wound care discussed. Should continue using warm compresses at home and attempting to massage purulent drainage out at home. Prescribed keflex and bactrim. Return precautions discussed.  Patient verbalized heunderstands and agrees with the above plan and is comfortable being discharged home at this time.      The patient was discharged home with a diagnosis of abscess L axilla. The patient was advised to return to the ED if their symptoms worsened or they developed new symptoms. It was recommended the patient to call PCP to schedule a follow up appointment. The patient was provided prescriptions for bactrim and keflex. The patient's vital signs and condition were closely observed while undergoing evaluation in the Emergency Department. The patient understands and agrees with the above plan for care. All questions and concerns were addressed. Patient was thoroughly educated on diagnosis, including treatment plan and possible etiologies. Patient was in agreement with discharge to home, no additional questions or concerns. The patient is aware they may return to the Emergency department at any time for re-valuation if needed.    The supervising physician for this case was Dr. Thomson.     Rebecca Mcdonald PA-C  05/17/23  2:00 PM    Clinical Impression     ED Diagnosis   1. Abscess of axilla, left  SERVICE TO [facility]          Disposition        Discharge after Treatment 5/17/2023  1:53 PM  There is no comment       Rebecca Mcdonald PA-C  05/17/23 1400

## 2023-06-09 ENCOUNTER — APPOINTMENT (OUTPATIENT)
Dept: CT IMAGING | Age: 23
End: 2023-06-09
Payer: COMMERCIAL

## 2023-06-09 ENCOUNTER — HOSPITAL ENCOUNTER (EMERGENCY)
Age: 23
Discharge: HOME | End: 2023-06-09
Attending: EMERGENCY MEDICINE
Payer: COMMERCIAL

## 2023-06-09 ENCOUNTER — APPOINTMENT (OUTPATIENT)
Dept: GENERAL RADIOLOGY | Age: 23
End: 2023-06-09
Payer: COMMERCIAL

## 2023-06-09 VITALS
HEART RATE: 86 BPM | BODY MASS INDEX: 28.52 KG/M2 | RESPIRATION RATE: 15 BRPM | SYSTOLIC BLOOD PRESSURE: 124 MMHG | WEIGHT: 165 LBS | TEMPERATURE: 98.5 F | OXYGEN SATURATION: 98 % | DIASTOLIC BLOOD PRESSURE: 95 MMHG
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
## 2023-06-09 DIAGNOSIS — G44.89 OTHER HEADACHE SYNDROME: ICD-10-CM

## 2023-06-09 LAB
ANION GAP SERPL CALCULATED.3IONS-SCNC: 6 MMOL/L (ref 9–17)
BASOPHILS # BLD: 0.04 K/UL (ref 0–0.2)
BASOPHILS NFR BLD: 0 % (ref 0–2)
BUN SERPL-MCNC: 12 MG/DL (ref 6–20)
CALCIUM SERPL-MCNC: 8.4 MG/DL (ref 8.6–10.4)
CHLORIDE SERPL-SCNC: 107 MMOL/L (ref 98–107)
CO2 SERPL-SCNC: 22 MMOL/L (ref 20–31)
CREAT SERPL-MCNC: 0.58 MG/DL (ref 0.5–0.9)
EOSINOPHIL # BLD: 0.05 K/UL (ref 0–0.44)
EOSINOPHILS RELATIVE PERCENT: 1 % (ref 1–4)
ERYTHROCYTE [DISTWIDTH] IN BLOOD BY AUTOMATED COUNT: 16.4 % (ref 11.8–14.4)
GFR SERPL CREATININE-BSD FRML MDRD: >60 ML/MIN/1.73M2
GLUCOSE SERPL-MCNC: 107 MG/DL (ref 70–99)
HCG SERPL QL: NEGATIVE
HCT VFR BLD AUTO: 33.2 % (ref 36.3–47.1)
HGB BLD-MCNC: 10 G/DL (ref 11.9–15.1)
IMM GRANULOCYTES # BLD AUTO: 0.04 K/UL (ref 0–0.3)
IMM GRANULOCYTES NFR BLD: 0 %
LYMPHOCYTES # BLD: 25 % (ref 24–43)
LYMPHOCYTES NFR BLD: 2.51 K/UL (ref 1.1–3.7)
MCH RBC QN AUTO: 22.6 PG (ref 25.2–33.5)
MCHC RBC AUTO-ENTMCNC: 30.1 G/DL (ref 28.4–34.8)
MCV RBC AUTO: 75.1 FL (ref 82.6–102.9)
MONOCYTES NFR BLD: 0.64 K/UL (ref 0.1–1.2)
MONOCYTES NFR BLD: 6 % (ref 3–12)
NEUTROPHILS NFR BLD: 68 % (ref 36–65)
NEUTS SEG NFR BLD: 6.98 K/UL (ref 1.5–8.1)
NRBC AUTOMATED: 0 PER 100 WBC
PLATELET # BLD AUTO: 239 K/UL (ref 138–453)
PMV BLD AUTO: 10.1 FL (ref 8.1–13.5)
POTASSIUM SERPL-SCNC: 4 MMOL/L (ref 3.7–5.3)
RBC # BLD AUTO: 4.42 M/UL (ref 3.95–5.11)
RBC # BLD: ABNORMAL 10*6/UL
SODIUM SERPL-SCNC: 135 MMOL/L (ref 135–144)
WBC OTHER # BLD: 10.3 K/UL (ref 3.5–11.3)

## 2023-06-09 PROCEDURE — 6360000002 HC RX W HCPCS

## 2023-06-09 PROCEDURE — 6360000002 HC RX W HCPCS: Performed by: EMERGENCY MEDICINE

## 2023-06-09 PROCEDURE — 72128 CT CHEST SPINE W/O DYE: CPT

## 2023-06-09 PROCEDURE — 70450 CT HEAD/BRAIN W/O DYE: CPT

## 2023-06-09 PROCEDURE — 80048 BASIC METABOLIC PNL TOTAL CA: CPT

## 2023-06-09 PROCEDURE — 6370000000 HC RX 637 (ALT 250 FOR IP)

## 2023-06-09 PROCEDURE — 85027 COMPLETE CBC AUTOMATED: CPT

## 2023-06-09 PROCEDURE — 72125 CT NECK SPINE W/O DYE: CPT

## 2023-06-09 PROCEDURE — 84703 CHORIONIC GONADOTROPIN ASSAY: CPT

## 2023-06-09 RX ORDER — OXYCODONE HYDROCHLORIDE 5 MG/1
5 TABLET ORAL EVERY 6 HOURS PRN
Qty: 6 TABLET | Refills: 0 | Status: SHIPPED | OUTPATIENT
Start: 2023-06-09 | End: 2023-06-11

## 2023-06-09 RX ORDER — ONDANSETRON 2 MG/ML
4 INJECTION INTRAMUSCULAR; INTRAVENOUS ONCE
Status: COMPLETED | OUTPATIENT
Start: 2023-06-09 | End: 2023-06-09

## 2023-06-09 RX ORDER — FENTANYL CITRATE 50 UG/ML
25 INJECTION, SOLUTION INTRAMUSCULAR; INTRAVENOUS ONCE
Status: COMPLETED | OUTPATIENT
Start: 2023-06-09 | End: 2023-06-09

## 2023-06-09 RX ORDER — ACETAMINOPHEN 500 MG
1000 TABLET ORAL ONCE
Status: COMPLETED | OUTPATIENT
Start: 2023-06-09 | End: 2023-06-09

## 2023-06-09 RX ADMIN — FENTANYL CITRATE 25 MCG: 50 INJECTION, SOLUTION INTRAMUSCULAR; INTRAVENOUS at 20:06

## 2023-06-09 RX ADMIN — ACETAMINOPHEN 1000 MG: 500 TABLET ORAL at 20:07

## 2023-06-09 RX ADMIN — ONDANSETRON 4 MG: 2 INJECTION INTRAMUSCULAR; INTRAVENOUS at 19:15

## 2023-06-09 ASSESSMENT — PAIN SCALES - GENERAL
PAINLEVEL_OUTOF10: 5
PAINLEVEL_OUTOF10: 9
PAINLEVEL_OUTOF10: 8

## 2023-06-09 ASSESSMENT — PAIN DESCRIPTION - LOCATION: LOCATION: HEAD

## 2023-06-09 ASSESSMENT — PAIN - FUNCTIONAL ASSESSMENT: PAIN_FUNCTIONAL_ASSESSMENT: 0-10

## 2023-06-09 ASSESSMENT — PAIN DESCRIPTION - PAIN TYPE: TYPE: ACUTE PAIN

## 2023-06-09 ASSESSMENT — LIFESTYLE VARIABLES
HOW OFTEN DO YOU HAVE A DRINK CONTAINING ALCOHOL: NEVER
HOW MANY STANDARD DRINKS CONTAINING ALCOHOL DO YOU HAVE ON A TYPICAL DAY: PATIENT DOES NOT DRINK
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
## 2023-06-10 NOTE — DISCHARGE INSTRUCTIONS
Thank you for visiting [facility] Emergency Department. You need to call ERICK Osman CNP to make an appointment as directed for follow up. Should you have any questions regarding your care or further treatment, [facility] [place] [facility] at [phone]. Please return to emergency department for any new or worrisome symptoms including any return of headache, numbness, weakness, tingling, vision changes.
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
## 2024-08-05 NOTE — PROGRESS NOTES
Chief Complaint: .Manju Rankin is here today for   Chief Complaint   Patient presents with    Office Visit          Medications: medications verified, no change    Refills needed today?  NO    Latex allergy or sensitivity: No known latex allergy     Patient would like communication of their results via:  [employer]    Cell Phone:   Telephone Information:   Mobile [phone]     Okay to leave a message containing results? Yes    Patient's current [facility] status: Active.    Advanced directives: on file    Care Teams Verified: No Changes    Depression Screen: yes    Tobacco history: verified       Health Maintenance       Medicare Advantage- Medicare Wellness Visit (Yearly - January to December)  Never done    COVID-19 Vaccine (6 - 2023-24 season)  Overdue since 2/29/2024    Depression Screening (Yearly)  Due soon on 2/2/2025    DTaP/Tdap/Td Vaccine (1 - Tdap)  Postponed until 2/2/2025    Shingles Vaccine (1 of 2)  Postponed until 1/28/2027           Following review of the above:  Patient is not proceeding with: COVID-19    Note: Refer to final orders and clinician documentation.             PPD Reading Note  PPD read and results entered in [address]. Result: 0 mm induration.   Interpretation: Negative  If test not read within 48-72 hours of initial placement, patient advised to repeat in other arm 1-3 weeks after this test.  Allergic reaction: no

## 2025-07-09 ENCOUNTER — APPOINTMENT (OUTPATIENT)
Dept: CT IMAGING | Age: 25
End: 2025-07-09

## 2025-07-09 ENCOUNTER — HOSPITAL ENCOUNTER (EMERGENCY)
Age: 25
Discharge: HOME | End: 2025-07-09
Attending: EMERGENCY MEDICINE

## 2025-07-09 VITALS
TEMPERATURE: 98.2 F | SYSTOLIC BLOOD PRESSURE: 162 MMHG | HEART RATE: 93 BPM | HEIGHT: 64 IN | WEIGHT: 260 LBS | BODY MASS INDEX: 44.39 KG/M2 | DIASTOLIC BLOOD PRESSURE: 114 MMHG | OXYGEN SATURATION: 95 % | RESPIRATION RATE: 16 BRPM

## 2025-07-09 DIAGNOSIS — R51.9 ACUTE INTRACTABLE HEADACHE, UNSPECIFIED HEADACHE TYPE: Primary | ICD-10-CM

## 2025-07-09 LAB
ANION GAP SERPL CALCULATED.3IONS-SCNC: 10 MMOL/L (ref 9–16)
BUN SERPL-MCNC: 9 MG/DL (ref 6–20)
CALCIUM SERPL-MCNC: 9.1 MG/DL (ref 8.6–10.4)
CHLORIDE SERPL-SCNC: 102 MMOL/L (ref 98–107)
CO2 SERPL-SCNC: 25 MMOL/L (ref 20–31)
CREAT SERPL-MCNC: 0.7 MG/DL (ref 0.6–0.9)
CRP SERPL HS-MCNC: 12 MG/L (ref 0–5)
ERYTHROCYTE [SEDIMENTATION RATE] IN BLOOD BY PHOTOMETRIC METHOD: 21 MM/HR (ref 0–20)
GFR, ESTIMATED: >90 ML/MIN/1.73M2
GLUCOSE SERPL-MCNC: 88 MG/DL (ref 74–99)
HCG SERPL QL: NEGATIVE
POTASSIUM SERPL-SCNC: 4.2 MMOL/L (ref 3.7–5.3)
SODIUM SERPL-SCNC: 137 MMOL/L (ref 136–145)

## 2025-07-09 PROCEDURE — 70450 CT HEAD/BRAIN W/O DYE: CPT

## 2025-07-09 PROCEDURE — 99285 EMERGENCY DEPT VISIT HI MDM: CPT

## 2025-07-09 PROCEDURE — 6360000002 HC RX W HCPCS: Performed by: STUDENT IN AN ORGANIZED HEALTH CARE EDUCATION/TRAINING PROGRAM

## 2025-07-09 PROCEDURE — 96375 TX/PRO/DX INJ NEW DRUG ADDON: CPT

## 2025-07-09 PROCEDURE — 2580000003 HC RX 258: Performed by: STUDENT IN AN ORGANIZED HEALTH CARE EDUCATION/TRAINING PROGRAM

## 2025-07-09 PROCEDURE — 85652 RBC SED RATE AUTOMATED: CPT

## 2025-07-09 PROCEDURE — 70498 CT ANGIOGRAPHY NECK: CPT

## 2025-07-09 PROCEDURE — 86140 C-REACTIVE PROTEIN: CPT

## 2025-07-09 PROCEDURE — 96374 THER/PROPH/DIAG INJ IV PUSH: CPT

## 2025-07-09 PROCEDURE — 80048 BASIC METABOLIC PNL TOTAL CA: CPT

## 2025-07-09 PROCEDURE — 84703 CHORIONIC GONADOTROPIN ASSAY: CPT

## 2025-07-09 PROCEDURE — 6360000004 HC RX CONTRAST MEDICATION: Performed by: EMERGENCY MEDICINE

## 2025-07-09 RX ORDER — DIPHENHYDRAMINE HYDROCHLORIDE 50 MG/ML
25 INJECTION, SOLUTION INTRAMUSCULAR; INTRAVENOUS ONCE
Status: COMPLETED | OUTPATIENT
Start: 2025-07-09 | End: 2025-07-09

## 2025-07-09 RX ORDER — PROCHLORPERAZINE EDISYLATE 5 MG/ML
10 INJECTION INTRAMUSCULAR; INTRAVENOUS ONCE
Status: COMPLETED | OUTPATIENT
Start: 2025-07-09 | End: 2025-07-09

## 2025-07-09 RX ORDER — 0.9 % SODIUM CHLORIDE 0.9 %
500 INTRAVENOUS SOLUTION INTRAVENOUS ONCE
Status: COMPLETED | OUTPATIENT
Start: 2025-07-09 | End: 2025-07-09

## 2025-07-09 RX ORDER — KETOROLAC TROMETHAMINE 30 MG/ML
30 INJECTION, SOLUTION INTRAMUSCULAR; INTRAVENOUS ONCE
Status: COMPLETED | OUTPATIENT
Start: 2025-07-09 | End: 2025-07-09

## 2025-07-09 RX ORDER — IOPAMIDOL 755 MG/ML
75 INJECTION, SOLUTION INTRAVASCULAR
Status: COMPLETED | OUTPATIENT
Start: 2025-07-09 | End: 2025-07-09

## 2025-07-09 RX ADMIN — PROCHLORPERAZINE EDISYLATE 10 MG: 5 INJECTION INTRAMUSCULAR; INTRAVENOUS at 16:53

## 2025-07-09 RX ADMIN — KETOROLAC TROMETHAMINE 30 MG: 30 INJECTION, SOLUTION INTRAMUSCULAR at 16:53

## 2025-07-09 RX ADMIN — DIPHENHYDRAMINE HYDROCHLORIDE 25 MG: 50 INJECTION, SOLUTION INTRAMUSCULAR; INTRAVENOUS at 16:52

## 2025-07-09 RX ADMIN — IOPAMIDOL 75 ML: 755 INJECTION, SOLUTION INTRAVENOUS at 18:49

## 2025-07-09 RX ADMIN — SODIUM CHLORIDE 500 ML: 0.9 INJECTION, SOLUTION INTRAVENOUS at 16:52

## 2025-07-09 ASSESSMENT — PAIN DESCRIPTION - DESCRIPTORS: DESCRIPTORS: ACHING

## 2025-07-09 ASSESSMENT — ENCOUNTER SYMPTOMS
VOMITING: 0
NAUSEA: 0

## 2025-07-09 ASSESSMENT — VISUAL ACUITY
OU: 20/70
OS: 20/70
OD: 20/70

## 2025-07-09 ASSESSMENT — PAIN DESCRIPTION - LOCATION: LOCATION: NECK

## 2025-07-09 ASSESSMENT — PAIN SCALES - GENERAL: PAINLEVEL_OUTOF10: 8

## 2025-07-09 NOTE — ED PROVIDER NOTES
[facility] EMERGENCY DEPARTMENT  Emergency Department Encounter  Emergency Medicine Resident     Pt Name:Arsen Zapata  MRN: [id number]  Birthdate 2000  Date of evaluation: 7/9/25  PCP:  Maria Ines Mclaughlin APRN - CNP  Note Started: 3:50 PM EDT      CHIEF COMPLAINT       Chief Complaint   Patient presents with    Headache    Neck Pain     Hx brain surgery years ago, has had procedures since     HISTORY OF PRESENT ILLNESS  (Location/Symptom, Timing/Onset, Context/Setting, Quality, Duration, Modifying Factors, Severity.)      Arsen Zapata is a 24 y.o. female with history of Charles 1 malformation presenting with worsening headache and neck pain.  She states that she lives with chronic headache and neck pain however the last 2 days it seems to have gotten worse.  She feels like there is a significant squeezing pressure on the entire left side of her face.  She states that her vision feels blurry and this happens when she gets headaches.    PAST MEDICAL / SURGICAL / SOCIAL / FAMILY HISTORY      has a past medical history of Anxiety, Bulimia nervosa ([facility]), BV (bacterial vaginosis), Chlamydia, Depression, Diarrhea, Gonorrhea, Migraine, Neurocardiogenic syncope, Obesity, Panic attacks, PFO (patent foramen ovale), PFO (patent foramen ovale), PTSD (post-traumatic stress disorder), Schizoaffective disorder, bipolar type ([facility]), Sensory integration disorder, and Trichimoniasis.     has a past surgical history that includes Appendectomy; Colonoscopy (8/13/2015); Endoscopy, colon, diagnostic; Tonsillectomy and adenoidectomy; [place] tooth extraction (Bilateral, 11/113/15); intrauterine device insertion (03/15/16); and Sleeve Gastrectomy (2018).    Social History     Socioeconomic History    Marital status: Single     Spouse name: Not on file    Number of children: Not on file    Years of education: Not on file    Highest education level: Not on file   Occupational History    Not on file   Tobacco Use    Smoking

## 2025-07-09 NOTE — ED NOTES
Pt presents to ED [facility] [address] 21A with c/o headache and neck pain. Pt states this is a chronic issue that has worsened the past couple of months. Pt see pain management for the issue but they have been unable to resolve the pain. Pt states hx of POTS and malformation that was operated on that provide small relief in the past. Regular tylenol not working at home. IV established. Pt is hypertensive on arrival. All other VSS. Will continue with plan of care.

## 2025-07-09 NOTE — ED PROVIDER NOTES
[facility]  Emergency Department  Faculty Attestation     I performed a history and physical examination of the patient and discussed management with the resident. I reviewed the resident’s note and agree with the documented findings and plan of care. Any areas of disagreement are noted on the chart. I was personally present for the key portions of any procedures. I have documented in the chart those procedures where I was not present during the key portions. I have reviewed the emergency nurses triage note. I agree with the chief complaint, past medical history, past surgical history, allergies, medications, social and family history as documented unless otherwise noted below.    For Physician Assistant/ Nurse Practitioner cases/documentation I have personally evaluated this patient and have completed at least one if not all key elements of the E/M (history, physical exam, and MDM). Additional findings are as noted.    Preliminary note started at 5:06 PM EDT    Primary Care Physician:  Maria Ines Mclaughlin, APRN - CNP    Screenings:  [unfilled]    CHIEF COMPLAINT       Chief Complaint   Patient presents with    Headache    Neck Pain     Hx brain surgery years ago, has had procedures since       RECENT VITALS:   BP (!) 162/114   Pulse 93   Temp 98.2 °F (36.8 °C)   Resp 16   Ht 1.626 m (5' 4\")   Wt 117.9 kg (260 lb)   SpO2 95%   BMI 44.63 kg/m²     LABS:  Labs Reviewed   HCG, SERUM, QUALITATIVE   BASIC METABOLIC PANEL W/ REFLEX TO MG FOR LOW K       Radiology  No orders to display       Attending Physician Additional  Notes  Patient has chronic headaches, daily headache for some time recently, today the headache is worse and she also has a new stiff neck.  No trauma or fever.  No sudden onset to the stiff neck.  No strokelike symptoms.  No use of anticoagulation.  She does not have abortive medications at home for this.  Past medical history is complicated including Chiari

## 2025-07-09 NOTE — ED PROVIDER NOTES
[facility] EMERGENCY DEPARTMENT  Emergency Department  Emergency Medicine Resident Turn-Over     Note Started: 5:53 PM EDT    Care of Arsen Zapata was assumed from Dr. Roche and is being seen for Headache and Neck Pain (Hx brain surgery years ago, has had procedures since)  .  The patient's initial evaluation and plan have been discussed with the prior provider who initially evaluated the patient.     EMERGENCY DEPARTMENT COURSE / MEDICAL DECISION MAKING:       MEDICATIONS GIVEN:  Orders Placed This Encounter   Medications    prochlorperazine (COMPAZINE) injection 10 mg    diphenhydrAMINE (BENADRYL) injection 25 mg    ketorolac (TORADOL) injection 30 mg    sodium chloride 0.9 % bolus 500 mL    iopamidol (ISOVUE-370) 76 % injection 75 mL       LABS / RADIOLOGY:     Labs Reviewed   C-REACTIVE PROTEIN - Abnormal; Notable for the following components:       Result Value    CRP 12.0 (*)     All other components within normal limits   SEDIMENTATION RATE - Abnormal; Notable for the following components:    Sed Rate, Automated 21 (*)     All other components within normal limits   BASIC METABOLIC PANEL W/ REFLEX TO MG FOR LOW K   HCG, SERUM, QUALITATIVE       CT HEAD WO CONTRAST  Result Date: 7/9/2025  EXAMINATION: CTA OF THE HEAD AND NECK WITH CONTRAST; CT OF THE HEAD WITHOUT CONTRAST 7/9/2025 6:36 pm: TECHNIQUE: CTA of the head and neck was performed with the administration of intravenous contrast. Multiplanar reformatted images are provided for review.  MIP images are provided for review. Stenosis of the internal carotid arteries measured using NASCET criteria. Automated exposure control, iterative reconstruction, and/or weight based adjustment of the mA/kV was utilized to reduce the radiation dose to as low as reasonably achievable.; CT of the head was performed without the administration of intravenous contrast. Automated exposure control, iterative reconstruction, and/or weight based adjustment of the

## 2025-07-10 NOTE — DISCHARGE INSTRUCTIONS
You were seen in the ED for headache.  CT imaging came within normal limits.  This is likely an acute exacerbation of your underlying migraines.    Please follow-up with neurology in the outpatient setting within the next 2 days.  Information for such an outpatient ointment has been provided.    Please return to the emergency department if you have any worsening of symptoms, blurry vision, weakness, numbness/tingling or no improvement in symptoms